# Patient Record
Sex: FEMALE | URBAN - METROPOLITAN AREA
[De-identification: names, ages, dates, MRNs, and addresses within clinical notes are randomized per-mention and may not be internally consistent; named-entity substitution may affect disease eponyms.]

---

## 2021-02-13 DIAGNOSIS — Z23 ENCOUNTER FOR IMMUNIZATION: ICD-10-CM

## 2021-02-19 ENCOUNTER — IMMUNIZATIONS (OUTPATIENT)
Dept: FAMILY MEDICINE CLINIC | Facility: HOSPITAL | Age: 76
End: 2021-02-19

## 2021-02-19 DIAGNOSIS — Z23 ENCOUNTER FOR IMMUNIZATION: Primary | ICD-10-CM

## 2021-02-19 PROCEDURE — 91300 SARS-COV-2 / COVID-19 MRNA VACCINE (PFIZER-BIONTECH) 30 MCG: CPT

## 2021-02-19 PROCEDURE — 0001A SARS-COV-2 / COVID-19 MRNA VACCINE (PFIZER-BIONTECH) 30 MCG: CPT

## 2021-03-12 ENCOUNTER — IMMUNIZATIONS (OUTPATIENT)
Dept: FAMILY MEDICINE CLINIC | Facility: HOSPITAL | Age: 76
End: 2021-03-12

## 2021-03-12 DIAGNOSIS — Z23 ENCOUNTER FOR IMMUNIZATION: Primary | ICD-10-CM

## 2021-03-12 PROCEDURE — 91300 SARS-COV-2 / COVID-19 MRNA VACCINE (PFIZER-BIONTECH) 30 MCG: CPT

## 2021-03-12 PROCEDURE — 0002A SARS-COV-2 / COVID-19 MRNA VACCINE (PFIZER-BIONTECH) 30 MCG: CPT

## 2025-02-20 ENCOUNTER — OFFICE VISIT (OUTPATIENT)
Age: 80
End: 2025-02-20
Payer: MEDICARE

## 2025-02-20 VITALS
HEIGHT: 63 IN | TEMPERATURE: 97.6 F | WEIGHT: 156 LBS | SYSTOLIC BLOOD PRESSURE: 168 MMHG | BODY MASS INDEX: 27.64 KG/M2 | OXYGEN SATURATION: 95 % | RESPIRATION RATE: 18 BRPM | HEART RATE: 110 BPM | DIASTOLIC BLOOD PRESSURE: 92 MMHG

## 2025-02-20 DIAGNOSIS — R03.0 ELEVATED BP WITHOUT DIAGNOSIS OF HYPERTENSION: ICD-10-CM

## 2025-02-20 DIAGNOSIS — E78.2 MIXED HYPERLIPIDEMIA: ICD-10-CM

## 2025-02-20 DIAGNOSIS — B02.9 HERPES ZOSTER WITHOUT COMPLICATION: ICD-10-CM

## 2025-02-20 DIAGNOSIS — Z87.891 HISTORY OF NICOTINE DEPENDENCE: ICD-10-CM

## 2025-02-20 DIAGNOSIS — Z76.89 ESTABLISHING CARE WITH NEW DOCTOR, ENCOUNTER FOR: Primary | ICD-10-CM

## 2025-02-20 PROCEDURE — 99203 OFFICE O/P NEW LOW 30 MIN: CPT | Performed by: INTERNAL MEDICINE

## 2025-02-20 RX ORDER — METHYLPREDNISOLONE 4 MG/1
TABLET ORAL
COMMUNITY
Start: 2025-02-05

## 2025-02-20 RX ORDER — GABAPENTIN 100 MG/1
CAPSULE ORAL
Qty: 99 CAPSULE | Refills: 0 | Status: SHIPPED | OUTPATIENT
Start: 2025-02-20 | End: 2025-03-28

## 2025-02-20 RX ORDER — VALACYCLOVIR HYDROCHLORIDE 1 G/1
TABLET, FILM COATED ORAL
COMMUNITY
Start: 2025-01-23

## 2025-02-20 RX ORDER — HYDROXYZINE HYDROCHLORIDE 25 MG/1
TABLET, FILM COATED ORAL
COMMUNITY
Start: 2025-02-05

## 2025-02-20 NOTE — PATIENT INSTRUCTIONS
"Patient Education     Checking your blood pressure at home   The Basics   Written by the doctors and editors at [facility]   How is blood pressure measured? -- It is usually measured with a device that goes around the upper arm. This is called a \"blood pressure cuff.\" This is often done in a doctor's office. But some people also check their blood pressure themselves, at home or at work. Doctors call this \"self-measured blood pressure monitoring.\"  Blood pressure is explained with 2 numbers. For instance, your blood pressure might be \"140 over 90.\" The first (top) number is the pressure inside your arteries when your heart is odessa. The second (bottom) number is the pressure inside your arteries when your heart is relaxed. The table shows how doctors and nurses define high and normal blood pressure (table 1).  If your blood pressure gets too high, it puts you at risk for heart attack, stroke, and kidney disease. High blood pressure does not usually cause symptoms. But it can be serious.  What is a home blood pressure meter? -- A home blood pressure meter (or \"monitor\") is a device you can use to check your blood pressure yourself. It has a cuff that goes around your upper arm (figure 1). Some devices have a cuff that goes around your wrist instead. But doctors aren't sure if these work as well. The meter also has a small screen, or dial, that shows your blood pressure numbers.  There are also special meters you can wear for a day or 2. These are different because they automatically check your blood pressure throughout the day and night, even while you are sleeping. If your doctor thinks that you should use one of these devices, they will tell you how to wear it.  Why do I need to check my blood pressure myself? -- If your doctor knows or suspects that you have high blood pressure, they might want you to check it at home. There are a few reasons for this. Your doctor might want to look at:   Whether your blood " pressure measures the same at home as it did in the doctor's office   How well your blood pressure medicines are working   Changes in your blood pressure, for example, if it goes up and down  People who check their own blood pressure usually do better at keeping it low.  How do I choose a home blood pressure meter? -- When choosing a home blood pressure meter, you will probably want to think about:   Cost - Some devices cost more than others. You should also check to see if your insurance will help pay for your device.   Size - It's important to make sure that the cuff fits your arm comfortably. Your doctor or nurse can help you with this.   How easy it is to use - Make sure that you understand how to use the device. You also need to be able to read the numbers on the screen.  You do not need a prescription to buy a home blood pressure meter. You can buy them at most pharmacies or online. Your doctor or nurse can help you choose the right device for you. They should also check your device about once every year to make sure that it is working correctly.  How do I check my blood pressure at home? -- Once you have a home blood pressure meter, your doctor or nurse should check it to make sure that it fits you and works correctly.  When it's time to check your blood pressure:   Go to the bathroom and empty your bladder first. Having a full bladder can temporarily increase your blood pressure, making the results inaccurate.   Sit in a chair with your feet flat on the ground.   Try to breathe normally and stay calm.   Attach the cuff to your arm. Place the cuff directly on your skin, not over your clothing. The cuff should be tight enough to not slip down, but not uncomfortably tight.   Sit and relax for about 3 to 5 minutes with the cuff on.   Follow the directions that came with your device to start measuring your blood pressure. This might involve squeezing the bulb at the end of the tube to inflate the cuff (fill it  with air). With some monitors, you press a button to inflate the cuff. When the cuff fills with air, it feels like someone is squeezing your arm, but it should not hurt. Then, you will slowly deflate the cuff (let the air out of it), or it will deflate by itself. The screen or dial will show your blood pressure numbers.   Stay seated and relax for 1 minute, then measure your blood pressure again.  How often should I check my blood pressure? -- It depends. Different people need to follow different schedules. Your doctor or nurse will tell you how often to check your blood pressure, and when. Some people need to check their blood pressure twice a day, in the morning and evening.  Your doctor or nurse will probably tell you to keep track of your blood pressure for at least a few days (table 2). Then, they will look at the numbers. This is because it's normal for your blood pressure to change a bit from day to day. For example, the numbers might change depending on whether you recently had caffeine, just exercised, or feel stressed. Checking your blood pressure over several days, or longer, gives your doctor or nurse a better idea of what is average for you.  How should I keep track of my blood pressure? -- Some blood pressure meters will record your numbers for you, or send them to your computer or smartphone. If yours does not do this, you need to write them down. Your doctor or nurse can help you figure out the best way to keep track of the numbers.  What if my blood pressure is high? -- Your doctor or nurse will tell you what to do if your blood pressure is high when you check it at home. If you get a number that is higher than normal, measure it again to see if it is still high. If it is very high (above a certain number, which your doctor or nurse will tell you to watch out for), call your doctor right away.  If your blood pressure is only a little high, your doctor or nurse might tell you to keep checking it for  "a few more days or weeks, and then call if it does not go back down. Then, they can help you decide what to do next.  All topics are updated as new evidence becomes available and our peer review process is complete.  This topic retrieved from [employer] on: Mar 29, 2024.  Topic 986597 Version 11.0  Release: 32.2.4 - C32.87  © 2024 UpToDate, Inc. and/or its affiliates. All rights reserved.  table 1: Definition of normal and high blood pressure  Level  Top number  Bottom number    High 130 or above 80 or above   Elevated 120 to 129 79 or below   Normal 119 or below 79 or below   These definitions are from the American College of Cardiology/American Heart Association. Other expert groups might use slightly different definitions.  \"Elevated blood pressure\" is a term doctor or nurses use as a warning. It means you do not yet have high blood pressure, but your blood pressure is not as low as it should be for good health.  Graphic 59589 Version 6.0  figure 1: Using a home blood pressure meter     This is an example of a person using a home blood pressure meter. Blood pressure is explained with 2 numbers. For instance, your blood pressure might be \"140 over 90.\" The \"systolic\" (first) number is the pressure inside your arteries when your heart is odessa. The \"diastolic\" (second) number is the pressure inside your arteries when your heart is relaxed. Most home blood pressure monitors also show your pulse. Your pulse is the number of times your heart beats in 1 minute.  Graphic 213443 Version 2.0  table 2: 7-day diary for checking blood pressure at home  Day 1  Day 2  Day 3  Day 4  Day 5  Day 6  Day 7    Morning  1st read Morning  1st read Morning  1st read Morning  1st read Morning  1st read Morning  1st read Morning  1st read   Systolic: __________ Systolic: __________ Systolic: __________ Systolic: __________ Systolic: __________ Systolic: __________ Systolic: __________   Diastolic: __________ Diastolic: __________ " Diastolic: __________ Diastolic: __________ Diastolic: __________ Diastolic: __________ Diastolic: __________   Pulse: __________ Pulse: __________ Pulse: __________ Pulse: __________ Pulse: __________ Pulse: __________ Pulse: __________   Morning  2nd read Morning  2nd read Morning  2nd read Morning  2nd read Morning  2nd read Morning  2nd read Morning  2nd read   Systolic: __________ Systolic: __________ Systolic: __________ Systolic: __________ Systolic: __________ Systolic: __________ Systolic: __________   Diastolic: __________ Diastolic: __________ Diastolic: __________ Diastolic: __________ Diastolic: __________ Diastolic: __________ Diastolic: __________   Pulse: __________ Pulse: __________ Pulse: __________ Pulse: __________ Pulse: __________ Pulse: __________ Pulse: __________   Evening  1st read Evening  1st read Evening  1st read Evening  1st read Evening  1st read Evening  1st read Evening  1st read   Systolic: __________ Systolic: __________ Systolic: __________ Systolic: __________ Systolic: __________ Systolic: __________ Systolic: __________   Diastolic: __________ Diastolic: __________ Diastolic: __________ Diastolic: __________ Diastolic: __________ Diastolic: __________ Diastolic: __________   Pulse: __________ Pulse: __________ Pulse: __________ Pulse: __________ Pulse: __________ Pulse: __________ Pulse: __________   Evening  2nd read Evening  2nd read Evening  2nd read Evening  2nd read Evening  2nd read Evening  2nd read Evening  2nd read   Systolic: __________ Systolic: __________ Systolic: __________ Systolic: __________ Systolic: __________ Systolic: __________ Systolic: __________   Diastolic: __________ Diastolic: __________ Diastolic: __________ Diastolic: __________ Diastolic: __________ Diastolic: __________ Diastolic: __________   Pulse: __________ Pulse: __________ Pulse: __________ Pulse: __________ Pulse: __________ Pulse: __________ Pulse: __________   Notes    Notes    Notes     "Notes    Notes    Notes    Notes      ____________________ ____________________ ____________________ ____________________ ____________________ ____________________ ____________________   ____________________ ____________________ ____________________ ____________________ ____________________ ____________________ ____________________   ____________________ ____________________ ____________________ ____________________ ____________________ ____________________ ____________________   Patient name: ______________________________     Patient ID: ________________________________    Primary care provider: _______________________    Average BP: _______________________________    You can use this table to keep track of your blood pressure (BP) and pulse.  When looking at your home meter, you will probably see at least 3 numbers:  Your \"systolic\" blood pressure: This is the top number of a BP measurement. For example, if your BP is \"140 over 90,\" 140 is the systolic.  Your \"diastolic\" blood pressure: This is the bottom number of a BP measurement. If your BP is \"140 over 90,\" 90 is the diastolic.  Your pulse: This is the number of times your heart beats in 1 minute.  BP: blood pressure.  Graphic 488641 Version 2.0  Consumer Information Use and Disclaimer   Disclaimer: This generalized information is a limited summary of diagnosis, treatment, and/or medication information. It is not meant to be comprehensive and should be used as a tool to help the user understand and/or assess potential diagnostic and treatment options. It does NOT include all information about conditions, treatments, medications, side effects, or risks that may apply to a specific patient. It is not intended to be medical advice or a substitute for the medical advice, diagnosis, or treatment of a health care provider based on the health care provider's examination and assessment of a patient's specific and unique circumstances. Patients must speak with a " "health care provider for complete information about their health, medical questions, and treatment options, including any risks or benefits regarding use of medications. This information does not endorse any treatments or medications as safe, effective, or approved for treating a specific patient. Up[employer], Inc[employer] and its affiliates disclaim any warranty or liability relating to this information or the use thereof.The use of this information is governed by the Terms of Use, available at https://www.woltersColdWattuwer.com/en/know/clinical-effectiveness-terms. 2024© UpToDate, Inc. and its affiliates and/or licensors. All rights reserved.  Copyright   © 2024 [employer]Date, Inc. and/or its affiliates. All rights reserved.  Patient Education     DASH diet   The Basics   Written by the doctors and editors at [employer]   What is the DASH diet? -- DASH stands for \"dietary approaches to stop hypertension.\" It is an eating plan that can help lower blood pressure. It can also help prevent high blood pressure, which doctors call \"hypertension.\" You don't need special foods or recipes to follow the DASH diet. It is more about eating certain types of foods in certain amounts.  The DASH diet has lots of fruits and vegetables, whole grains, lean meats, healthy fats, and low-fat or fat-free dairy products (figure 1). It is low in saturated fats, trans fats, cholesterol, added sugars, and sodium (salt).  The standard DASH diet limits sodium to no more than 2300 mg a day. Your doctor or nurse can talk to you about what your specific goals should be.  Why do I need the DASH diet? -- The DASH diet can help you:   Lower your blood pressure and cholesterol   Lower your risk for cancer, heart disease, heart attack, and stroke. It might also lower your risk for heart failure, kidney stones, and diabetes.   Lose weight or keep a healthy weight  What can I eat and drink on the DASH diet? -- Below are some guidelines and examples for your daily and weekly " "nutrition goals. These are based on a 2000-calorie-per-day eating plan.  Daily goals:   Grains - Try to eat 6 to 8 servings of whole-grain, high-fiber foods each day. Examples of a serving include 1 slice of bread, 1 ounce (30 grams) of dry cereal, or 1/2 cup (120 grams) of cooked cereal, pasta, or brown rice.   Fruits - Try to eat 4 to 5 servings of fruit each day. Examples of a serving include 1 medium fruit or 1/2 cup (75 grams) of fresh, frozen, or canned fruit. Try to eat different kinds and colors. Frozen or canned fruit should not have added sugar. Look for frozen or canned fruits with 100 percent fruit juice or water.   Vegetables - Try to eat 4 to 5 servings of vegetables each day. Examples of a serving include 1 cup (40 grams) of leafy greens or 1/2 cup (75 grams) of fresh or cooked vegetables. Try to pick many kinds and colors. If you buy canned vegetables, look for \"low sodium\" or \"salt free.\" Buy plain, frozen vegetables to avoid added fat and sodium.   Dairy - Try to eat 2 to 3 servings of fat-free or low-fat milk products each day. Examples of a serving include 1 cup (240 mL) of milk or yogurt or 1.5 ounces (45 grams) of cheese.   Lean meats, poultry, and seafood - Try to eat 6 or fewer servings of lean meat, poultry, and seafood each day. Examples of a serving include 1 egg or 1 ounce (30 grams) of cooked meat, poultry, or fish. Try to choose more low-fat or lean meats like chicken, fish, or turkey. Eat less red meat.   Fats and oils - Try to eat 2 to 3 servings of fats and oils each day. Examples of a serving include 1 teaspoon (5 mL) of soft margarine or vegetable oil, or 1 tablespoon (18 grams) of mayonnaise. Eat healthy fats like those found in fish, nuts, and avocados. Try using olive oil or vegetable oils such as canola oil. You can also try corn, safflower, sunflower, or soybean oils. Use low-sodium and low-fat salad dressing and mayonnaise.  Weekly goals:   Nuts, seeds, and legumes (dry beans " and peas) - Try to eat 4 to 5 servings each week. Examples of a serving include 1/3 cup (45 grams) of nuts, 2 tablespoons (50 grams) of nut butter or seeds, or 1/2 cup (75 grams) of cooked legumes. Try almonds and walnuts, sunflower seeds, peanut or other nut butters, soybeans, lentils, kidney beans, and split peas.   Sweets - Try to eat fewer than 5 servings each week. Examples of a serving include 1 tablespoon (14 grams) of sugar or jelly, or 1/2 cup (120 grams) of gelatin. Choose low-fat and trans fat-free desserts. These include fruit-flavored gelatin, sorbet, jellybeans, rabia crackers, animal crackers, low-fat fig bars, and madhav snaps. Eat fruit to satisfy the desire for sweets.  To add flavor, use pepper, herbs, spices, vinegar, or lemon or lime juices. Choose low-sodium or salt-free products whenever you can. This is especially important for foods like broths, soups, or soy sauce.  What foods and drinks should I avoid on the DASH diet?    Grains to avoid - Salted breads, rolls, crackers, quick breads, self-rising flours, biscuit mixes, regular breadcrumbs, instant hot cereals, commercially prepared rice, pasta, stuffing mixes.   Fruits and vegetables to avoid - Store-bought prepared potatoes and vegetable mixes, regular canned vegetables and juices, vegetables frozen with sauce, pickled vegetables, processed fruits with salt or sodium.   Dairy products to avoid - Whole milk, malted milk, chocolate milk, buttermilk, full-fat cheese, ice cream.   Meats to avoid - Smoked, cured, salted, or canned fish such as sardines or anchovies. High-fat cuts of meat like beef, lamb, pork, zambrano and sausage, and chicken with the skin on it.   Fats and oils to avoid - Eat fewer solid fats like butter, lard, and hard stick margarine. Eat less saturated fat, trans fat, and total fat.   Condiments and snacks to avoid - Salted and canned peas, beans, and olives. Salted snack foods, fried foods, soda, other sweetened drinks.    Sweets to avoid - High-fat baked goods such as muffins, donuts, pastries, and commercial baked goods. Candy bars.   Alcohol - If you choose to drink alcohol, limit the amount. Most doctors recommend limiting alcohol to no more than 1 drink a day (for females) or 2 drinks a day (for males).  What else do I need to know?    Get regular physical activity to make this diet help you even more. Even gentle forms of activity, like walking, are good for your health.   Try baking or broiling instead of frying foods.   Write down the foods that you eat. This will help you track what you have eaten each week.   When you go to the grocery store, have a list or a meal plan. Don't shop when you are hungry, since this might lead you to buy more unhealthy foods.   Read food labels with care (figure 2). They show you how much is in a serving. The amount is given as a percentage of the total amount that you need each day. Reading labels helps you make healthy food choices.  All topics are updated as new evidence becomes available and our peer review process is complete.  This topic retrieved from [email or web address] on: Feb 26, 2024.  Topic 186603 Version 1.0  Release: 32.2.4 - C32.56  © 2024 UpToDate, Inc. and/or its affiliates. All rights reserved.  figure 1: DASH diet     Graphic 390281 Version 1.0  figure 2: Food label     Graphic 673853 Version 1.0  Consumer Information Use and Disclaimer   Disclaimer: This generalized information is a limited summary of diagnosis, treatment, and/or medication information. It is not meant to be comprehensive and should be used as a tool to help the user understand and/or assess potential diagnostic and treatment options. It does NOT include all information about conditions, treatments, medications, side effects, or risks that may apply to a specific patient. It is not intended to be medical advice or a substitute for the medical advice, diagnosis, or treatment of a health care provider based on the health  care provider's examination and assessment of a patient's specific and unique circumstances. Patients must speak with a health care provider for complete information about their health, medical questions, and treatment options, including any risks or benefits regarding use of medications. This information does not endorse any treatments or medications as safe, effective, or approved for treating a specific patient. Up[employer], Inc[employer] and its affiliates disclaim any warranty or liability relating to this information or the use thereof.The use of this information is governed by the Terms of Use, available at https://www.wolRivalryuwer.com/en/know/clinical-effectiveness-terms. 2024© UpToDate, Inc. and its affiliates and/or licensors. All rights reserved.  Copyright   © 2024 Up[employer]Date, Inc. and/or its affiliates. All rights reserved.

## 2025-02-20 NOTE — PROGRESS NOTES
Name: Donna Schoenek      : 1945      MRN: [id number]  Encounter Provider: Eric Szymanski MD  Encounter Date: 2025   Encounter department: [facility] PRIMARY CARE  :  Assessment & Plan  Establishing care with new doctor, encounter for    Orders:    CBC and differential; Future    Comprehensive metabolic panel; Future    gabapentin (Neurontin) 100 mg capsule; Take 1 capsule (100 mg total) by mouth daily at bedtime for 3 days, THEN 1 capsule (100 mg total) 2 (two) times a day for 3 days, THEN 1 capsule (100 mg total) 3 (three) times a day.    Mixed hyperlipidemia    Orders:    CBC and differential; Future    Comprehensive metabolic panel; Future    Lipid panel; Future    gabapentin (Neurontin) 100 mg capsule; Take 1 capsule (100 mg total) by mouth daily at bedtime for 3 days, THEN 1 capsule (100 mg total) 2 (two) times a day for 3 days, THEN 1 capsule (100 mg total) 3 (three) times a day.    Herpes zoster without complication  Patient had herpes zoster rash on the left side of the face close to the years, end of January treated with a course of Valtrex and antibiotics, rash is now improved.  Reports that sensitivity and tingling pain in that area since then, somewhat improving with ice packs.  Will start gabapentin 100 mg at bedtime and uptitrate as tolerated.  Discussed supportive management, follow-up in 1 month.  Patient cautioned for drowsiness.  Encourage hydration,  Orders:    CBC and differential; Future    Comprehensive metabolic panel; Future    gabapentin (Neurontin) 100 mg capsule; Take 1 capsule (100 mg total) by mouth daily at bedtime for 3 days, THEN 1 capsule (100 mg total) 2 (two) times a day for 3 days, THEN 1 capsule (100 mg total) 3 (three) times a day.    History of nicotine dependence  Quit in .  Encourage to remain smoke-free.  Orders:    CBC and differential; Future    Comprehensive metabolic panel; Future    gabapentin (Neurontin) 100 mg capsule; Take 1  capsule (100 mg total) by mouth daily at bedtime for 3 days, THEN 1 capsule (100 mg total) 2 (two) times a day for 3 days, THEN 1 capsule (100 mg total) 3 (three) times a day.    Elevated BP without diagnosis of hypertension  Patient reports she has a whitecoat hypertension.  Recommend to follow-up with her home BP logs in 4 weeks, encouraged DASH diet, complete blood work, and to follow-up in 4 weeks.  repeat BP was 168/70  Orders:    Albumin / creatinine urine ratio; Future    Urinalysis with microscopic; Future           History of Present Illness     Patient comes to establish care.  She has not access to medical care in the last several years.  She was seen recently at urgent care for left-sided herpes zoster treated appropriately with Valtrex and steroid.  Patient continues to have tingling and sharp shooting pain with sensitivity in the area of the rash.  Denies other subjective complaints.  HPI  Review of Systems   Constitutional:  Negative for appetite change, chills, diaphoresis, fatigue, fever and unexpected weight change.   Respiratory:  Negative for apnea, cough, choking, chest tightness, shortness of breath, wheezing and stridor.    Cardiovascular:  Negative for chest pain, palpitations and leg swelling.   Gastrointestinal:  Negative for abdominal distention, abdominal pain, anal bleeding, blood in stool, constipation, diarrhea, nausea and vomiting.   Genitourinary:  Negative for decreased urine volume, difficulty urinating, frequency and urgency.   Musculoskeletal:  Negative for arthralgias, back pain and myalgias.   Skin:  Positive for rash.   Neurological:  Negative for dizziness, light-headedness, numbness and headaches.     \  Past Medical History   Past Medical History:   Diagnosis Date    Shingles      History reviewed. No pertinent surgical history.  Family History   Problem Relation Age of Onset    Hearing loss Mother     COPD Mother     Heart disease Mother     Coronary artery disease Mother   "   Arthritis Mother     Vision loss Mother         detached retna    Heart disease Father       reports that she quit smoking about 25 years ago. Her smoking use included cigarettes. She started smoking about 60 years ago. She has a 35 pack-year smoking history. She has never used smokeless tobacco. She reports current alcohol use. She reports that she does not use drugs.  Current Outpatient Medications   Medication Instructions    gabapentin (Neurontin) 100 mg capsule Take 1 capsule (100 mg total) by mouth daily at bedtime for 3 days, THEN 1 capsule (100 mg total) 2 (two) times a day for 3 days, THEN 1 capsule (100 mg total) 3 (three) times a day.    hydrOXYzine HCL (ATARAX) 25 mg tablet TAKE 1 TABLET BY MOUTH FOUR TIMES DAILY AS NEEDED FOR ITCHING    methylPREDNISolone 4 MG tablet therapy pack take as directed per package instructions    valACYclovir (VALTREX) 1,000 mg tablet take 1 tablet by mouth three times daily for 7 days     Allergies   Allergen Reactions    Pollen Extract Sneezing      Objective   /92 (BP Location: Left arm, Patient Position: Sitting, Cuff Size: Standard)   Pulse (!) 110   Temp 97.6 °F (36.4 °C) (Temporal)   Resp 18   Ht 5' 2.75\" (1.594 m)   Wt 70.8 kg (156 lb)   SpO2 95%   BMI 27.85 kg/m²      Physical Exam  Constitutional:       General: She is not in acute distress.     Appearance: Normal appearance. She is normal weight. She is not ill-appearing, toxic-appearing or diaphoretic.   HENT:      Right Ear: Tympanic membrane normal.      Left Ear: Tympanic membrane normal.      Mouth/Throat:      Pharynx: No oropharyngeal exudate or posterior oropharyngeal erythema.   Cardiovascular:      Rate and Rhythm: Normal rate and regular rhythm.      Pulses: Normal pulses.      Heart sounds: Normal heart sounds. No murmur heard.     No gallop.   Pulmonary:      Effort: Pulmonary effort is normal. No respiratory distress.      Breath sounds: Normal breath sounds. No stridor. No wheezing, " rhonchi or rales.   Chest:      Chest wall: No tenderness.   Musculoskeletal:      Right lower leg: No edema.      Left lower leg: No edema.   Skin:     Comments: Small area of healing rash is seen just behind the left ear.   Neurological:      Mental Status: She is alert and oriented to person, place, and time.

## 2025-03-12 ENCOUNTER — APPOINTMENT (OUTPATIENT)
Dept: LAB | Facility: HOSPITAL | Age: 80
End: 2025-03-12
Payer: MEDICARE

## 2025-03-12 DIAGNOSIS — Z76.89 ESTABLISHING CARE WITH NEW DOCTOR, ENCOUNTER FOR: ICD-10-CM

## 2025-03-12 DIAGNOSIS — Z87.891 HISTORY OF NICOTINE DEPENDENCE: ICD-10-CM

## 2025-03-12 DIAGNOSIS — E78.2 MIXED HYPERLIPIDEMIA: ICD-10-CM

## 2025-03-12 DIAGNOSIS — R03.0 ELEVATED BP WITHOUT DIAGNOSIS OF HYPERTENSION: ICD-10-CM

## 2025-03-12 DIAGNOSIS — B02.9 HERPES ZOSTER WITHOUT COMPLICATION: ICD-10-CM

## 2025-03-12 LAB
ALBUMIN SERPL BCG-MCNC: 4.3 G/DL (ref 3.5–5)
ALP SERPL-CCNC: 81 U/L (ref 34–104)
ALT SERPL W P-5'-P-CCNC: 17 U/L (ref 7–52)
ANION GAP SERPL CALCULATED.3IONS-SCNC: 10 MMOL/L (ref 4–13)
AST SERPL W P-5'-P-CCNC: 20 U/L (ref 13–39)
BACTERIA UR QL AUTO: ABNORMAL /HPF
BASOPHILS # BLD AUTO: 0.05 THOUSANDS/ÂΜL (ref 0–0.1)
BASOPHILS NFR BLD AUTO: 1 % (ref 0–1)
BILIRUB SERPL-MCNC: 0.49 MG/DL (ref 0.2–1)
BILIRUB UR QL STRIP: NEGATIVE
BUN SERPL-MCNC: 15 MG/DL (ref 5–25)
CALCIUM SERPL-MCNC: 9.6 MG/DL (ref 8.4–10.2)
CHLORIDE SERPL-SCNC: 104 MMOL/L (ref 96–108)
CHOLEST SERPL-MCNC: 211 MG/DL (ref ?–200)
CLARITY UR: ABNORMAL
CO2 SERPL-SCNC: 27 MMOL/L (ref 21–32)
COLOR UR: YELLOW
CREAT SERPL-MCNC: 0.6 MG/DL (ref 0.6–1.3)
CREAT UR-MCNC: 204.3 MG/DL
EOSINOPHIL # BLD AUTO: 0.08 THOUSAND/ÂΜL (ref 0–0.61)
EOSINOPHIL NFR BLD AUTO: 1 % (ref 0–6)
ERYTHROCYTE [DISTWIDTH] IN BLOOD BY AUTOMATED COUNT: 13.2 % (ref 11.6–15.1)
GFR SERPL CREATININE-BSD FRML MDRD: 87 ML/MIN/1.73SQ M
GLUCOSE P FAST SERPL-MCNC: 99 MG/DL (ref 65–99)
GLUCOSE UR STRIP-MCNC: NEGATIVE MG/DL
HCT VFR BLD AUTO: 45.2 % (ref 34.8–46.1)
HDLC SERPL-MCNC: 60 MG/DL
HGB BLD-MCNC: 14.6 G/DL (ref 11.5–15.4)
HGB UR QL STRIP.AUTO: NEGATIVE
IMM GRANULOCYTES # BLD AUTO: 0.02 THOUSAND/UL (ref 0–0.2)
IMM GRANULOCYTES NFR BLD AUTO: 0 % (ref 0–2)
KETONES UR STRIP-MCNC: NEGATIVE MG/DL
LDLC SERPL CALC-MCNC: 121 MG/DL (ref 0–100)
LEUKOCYTE ESTERASE UR QL STRIP: ABNORMAL
LYMPHOCYTES # BLD AUTO: 2.3 THOUSANDS/ÂΜL (ref 0.6–4.47)
LYMPHOCYTES NFR BLD AUTO: 27 % (ref 14–44)
MCH RBC QN AUTO: 32.2 PG (ref 26.8–34.3)
MCHC RBC AUTO-ENTMCNC: 32.3 G/DL (ref 31.4–37.4)
MCV RBC AUTO: 100 FL (ref 82–98)
MICROALBUMIN UR-MCNC: 24.4 MG/L
MICROALBUMIN/CREAT 24H UR: 12 MG/G CREATININE (ref 0–30)
MONOCYTES # BLD AUTO: 0.74 THOUSAND/ÂΜL (ref 0.17–1.22)
MONOCYTES NFR BLD AUTO: 9 % (ref 4–12)
MUCOUS THREADS UR QL AUTO: ABNORMAL
NEUTROPHILS # BLD AUTO: 5.2 THOUSANDS/ÂΜL (ref 1.85–7.62)
NEUTS SEG NFR BLD AUTO: 62 % (ref 43–75)
NITRITE UR QL STRIP: NEGATIVE
NON-SQ EPI CELLS URNS QL MICRO: ABNORMAL /HPF
NONHDLC SERPL-MCNC: 151 MG/DL
NRBC BLD AUTO-RTO: 0 /100 WBCS
PH UR STRIP.AUTO: 6 [PH]
PLATELET # BLD AUTO: 204 THOUSANDS/UL (ref 149–390)
PMV BLD AUTO: 9.3 FL (ref 8.9–12.7)
POTASSIUM SERPL-SCNC: 3.8 MMOL/L (ref 3.5–5.3)
PROT SERPL-MCNC: 7.6 G/DL (ref 6.4–8.4)
PROT UR STRIP-MCNC: NEGATIVE MG/DL
RBC # BLD AUTO: 4.53 MILLION/UL (ref 3.81–5.12)
RBC #/AREA URNS AUTO: ABNORMAL /HPF
SODIUM SERPL-SCNC: 141 MMOL/L (ref 135–147)
SP GR UR STRIP.AUTO: 1.02 (ref 1–1.03)
TRIGL SERPL-MCNC: 152 MG/DL (ref ?–150)
UROBILINOGEN UR QL STRIP.AUTO: 0.2 E.U./DL
WBC # BLD AUTO: 8.39 THOUSAND/UL (ref 4.31–10.16)
WBC #/AREA URNS AUTO: ABNORMAL /HPF

## 2025-03-12 PROCEDURE — 81001 URINALYSIS AUTO W/SCOPE: CPT

## 2025-03-12 PROCEDURE — 82570 ASSAY OF URINE CREATININE: CPT

## 2025-03-12 PROCEDURE — 80061 LIPID PANEL: CPT

## 2025-03-12 PROCEDURE — 82043 UR ALBUMIN QUANTITATIVE: CPT

## 2025-03-12 PROCEDURE — 36415 COLL VENOUS BLD VENIPUNCTURE: CPT

## 2025-03-12 PROCEDURE — 85025 COMPLETE CBC W/AUTO DIFF WBC: CPT

## 2025-03-12 PROCEDURE — 80053 COMPREHEN METABOLIC PANEL: CPT

## 2025-03-18 ENCOUNTER — RA CDI HCC (OUTPATIENT)
Dept: OTHER | Facility: HOSPITAL | Age: 80
End: 2025-03-18

## 2025-03-18 NOTE — PROGRESS NOTES
HCC coding opportunities       Chart reviewed, no opportunity found: CHART REVIEWED, NO OPPORTUNITY FOUND        Patients Insurance     Medicare Insurance: Medicare           This was a shared visit with the NICOL. I reviewed and verified the documentation.

## 2025-03-20 ENCOUNTER — OFFICE VISIT (OUTPATIENT)
Age: 80
End: 2025-03-20
Payer: MEDICARE

## 2025-03-20 VITALS
SYSTOLIC BLOOD PRESSURE: 161 MMHG | RESPIRATION RATE: 16 BRPM | WEIGHT: 157.8 LBS | OXYGEN SATURATION: 97 % | BODY MASS INDEX: 29.04 KG/M2 | HEIGHT: 62 IN | HEART RATE: 108 BPM | DIASTOLIC BLOOD PRESSURE: 88 MMHG | TEMPERATURE: 97.6 F

## 2025-03-20 DIAGNOSIS — Z87.891 HISTORY OF NICOTINE DEPENDENCE: ICD-10-CM

## 2025-03-20 DIAGNOSIS — Z11.59 NEED FOR HEPATITIS C SCREENING TEST: ICD-10-CM

## 2025-03-20 DIAGNOSIS — G47.00 INSOMNIA, UNSPECIFIED TYPE: ICD-10-CM

## 2025-03-20 DIAGNOSIS — I10 PRIMARY HYPERTENSION: ICD-10-CM

## 2025-03-20 DIAGNOSIS — E78.2 MIXED HYPERLIPIDEMIA: Primary | ICD-10-CM

## 2025-03-20 DIAGNOSIS — B02.9 HERPES ZOSTER WITHOUT COMPLICATION: ICD-10-CM

## 2025-03-20 DIAGNOSIS — Z23 ENCOUNTER FOR IMMUNIZATION: ICD-10-CM

## 2025-03-20 DIAGNOSIS — Z78.0 ENCOUNTER FOR OSTEOPOROSIS SCREENING IN ASYMPTOMATIC POSTMENOPAUSAL PATIENT: ICD-10-CM

## 2025-03-20 DIAGNOSIS — Z00.00 MEDICARE ANNUAL WELLNESS VISIT, SUBSEQUENT: ICD-10-CM

## 2025-03-20 DIAGNOSIS — Z13.820 ENCOUNTER FOR OSTEOPOROSIS SCREENING IN ASYMPTOMATIC POSTMENOPAUSAL PATIENT: ICD-10-CM

## 2025-03-20 PROCEDURE — G0439 PPPS, SUBSEQ VISIT: HCPCS | Performed by: INTERNAL MEDICINE

## 2025-03-20 PROCEDURE — G0009 ADMIN PNEUMOCOCCAL VACCINE: HCPCS | Performed by: INTERNAL MEDICINE

## 2025-03-20 PROCEDURE — 99213 OFFICE O/P EST LOW 20 MIN: CPT | Performed by: INTERNAL MEDICINE

## 2025-03-20 PROCEDURE — G2211 COMPLEX E/M VISIT ADD ON: HCPCS | Performed by: INTERNAL MEDICINE

## 2025-03-20 PROCEDURE — 90677 PCV20 VACCINE IM: CPT | Performed by: INTERNAL MEDICINE

## 2025-03-20 RX ORDER — GABAPENTIN 100 MG/1
CAPSULE ORAL
Qty: 90 CAPSULE | Refills: 0 | Status: SHIPPED | OUTPATIENT
Start: 2025-03-20

## 2025-03-20 RX ORDER — HYDROXYZINE HYDROCHLORIDE 25 MG/1
25 TABLET, FILM COATED ORAL
Qty: 30 TABLET | Refills: 0 | Status: SHIPPED | OUTPATIENT
Start: 2025-03-20

## 2025-03-20 NOTE — PATIENT INSTRUCTIONS
Medicare Preventive Visit Patient Instructions  Thank you for completing your Welcome to Medicare Visit or Medicare Annual Wellness Visit today. Your next wellness visit will be due in one year (3/21/2026).  The screening/preventive services that you may require over the next 5-10 years are detailed below. Some tests may not apply to you based off risk factors and/or age. Screening tests ordered at today's visit but not completed yet may show as past due. Also, please note that scanned in results may not display below.  Preventive Screenings:  Service Recommendations Previous Testing/Comments   Colorectal Cancer Screening  * Colonoscopy    * Fecal Occult Blood Test (FOBT)/Fecal Immunochemical Test (FIT)  * Fecal DNA/Cologuard Test  * Flexible Sigmoidoscopy Age: 45-75 years old   Colonoscopy: every 10 years (may be performed more frequently if at higher risk)  OR  FOBT/FIT: every 1 year  OR  Cologuard: every 3 years  OR  Sigmoidoscopy: every 5 years  Screening may be recommended earlier than age 45 if at higher risk for colorectal cancer. Also, an individualized decision between you and your healthcare provider will decide whether screening between the ages of 76-85 would be appropriate. Colonoscopy: Not on file  FOBT/FIT: Not on file  Cologuard: Not on file  Sigmoidoscopy: Not on file    Screening Not Indicated     Breast Cancer Screening Age: 40+ years old  Frequency: every 1-2 years  Not required if history of left and right mastectomy Mammogram: Not on file    Screening Not Indicated   Cervical Cancer Screening Between the ages of 21-29, pap smear recommended once every 3 years.   Between the ages of 30-65, can perform pap smear with HPV co-testing every 5 years.   Recommendations may differ for women with a history of total hysterectomy, cervical cancer, or abnormal pap smears in past. Pap Smear: Not on file    Screening Not Indicated   Hepatitis C Screening Once for adults born between 1945 and 1965  More  frequently in patients at high risk for Hepatitis C Hep C Antibody: Not on file    Patient Declines   Diabetes Screening 1-2 times per year if you're at risk for diabetes or have pre-diabetes Fasting glucose: 99 mg/dL (3/12/2025)  A1C: No results in last 5 years (No results in last 5 years)  Screening Current   Cholesterol Screening Once every 5 years if you don't have a lipid disorder. May order more often based on risk factors. Lipid panel: 03/12/2025    Screening Not Indicated  History Lipid Disorder     Other Preventive Screenings Covered by Medicare:  Abdominal Aortic Aneurysm (AAA) Screening: covered once if your at risk. You're considered to be at risk if you have a family history of AAA.  Lung Cancer Screening: covers low dose CT scan once per year if you meet all of the following conditions: (1) Age 55-77; (2) No signs or symptoms of lung cancer; (3) Current smoker or have quit smoking within the last 15 years; (4) You have a tobacco smoking history of at least 20 pack years (packs per day multiplied by number of years you smoked); (5) You get a written order from a healthcare provider.  Glaucoma Screening: covered annually if you're considered high risk: (1) You have diabetes OR (2) Family history of glaucoma OR (3)  aged 50 and older OR (4)  American aged 65 and older  Osteoporosis Screening: covered every 2 years if you meet one of the following conditions: (1) You're estrogen deficient and at risk for osteoporosis based off medical history and other findings; (2) Have a vertebral abnormality; (3) On glucocorticoid therapy for more than 3 months; (4) Have primary hyperparathyroidism; (5) On osteoporosis medications and need to assess response to drug therapy.   Last bone density test (DXA Scan): Not on file.  HIV Screening: covered annually if you're between the age of 15-65. Also covered annually if you are younger than 15 and older than 65 with risk factors for HIV infection.  For pregnant patients, it is covered up to 3 times per pregnancy.    Immunizations:  Immunization Recommendations   Influenza Vaccine Annual influenza vaccination during flu season is recommended for all persons aged >= 6 months who do not have contraindications   Pneumococcal Vaccine   * Pneumococcal conjugate vaccine = PCV13 (Prevnar 13), PCV15 (Vaxneuvance), PCV20 (Prevnar 20)  * Pneumococcal polysaccharide vaccine = PPSV23 (Pneumovax) Adults 19-63 yo with certain risk factors or if 65+ yo  If never received any pneumonia vaccine: recommend Prevnar 20 (PCV20)  Give PCV20 if previously received 1 dose of PCV13 or PPSV23   Hepatitis B Vaccine 3 dose series if at intermediate or high risk (ex: diabetes, end stage renal disease, liver disease)   Respiratory syncytial virus (RSV) Vaccine - COVERED BY MEDICARE PART D  * RSVPreF3 (Arexvy) CDC recommends that adults 60 years of age and older may receive a single dose of RSV vaccine using shared clinical decision-making (SCDM)   Tetanus (Td) Vaccine - COST NOT COVERED BY MEDICARE PART B Following completion of primary series, a booster dose should be given every 10 years to maintain immunity against tetanus. Td may also be given as tetanus wound prophylaxis.   Tdap Vaccine - COST NOT COVERED BY MEDICARE PART B Recommended at least once for all adults. For pregnant patients, recommended with each pregnancy.   Shingles Vaccine (Shingrix) - COST NOT COVERED BY MEDICARE PART B  2 shot series recommended in those 19 years and older who have or will have weakened immune systems or those 50 years and older     Health Maintenance Due:      Topic Date Due   • Hepatitis C Screening  Never done     Immunizations Due:      Topic Date Due   • Pneumococcal Vaccine: 65+ Years (1 of 1 - PCV) Never done   • Influenza Vaccine (1) Never done   • COVID-19 Vaccine (3 - 2024-25 season) 09/01/2024     Advance Directives   What are advance directives?  Advance directives are legal documents that  state your wishes and plans for medical care. These plans are made ahead of time in case you lose your ability to make decisions for yourself. Advance directives can apply to any medical decision, such as the treatments you want, and if you want to donate organs.   What are the types of advance directives?  There are many types of advance directives, and each state has rules about how to use them. You may choose a combination of any of the following:  Living will:  This is a written record of the treatment you want. You can also choose which treatments you do not want, which to limit, and which to stop at a certain time. This includes surgery, medicine, IV fluid, and tube feedings.   Durable power of  for healthcare (DPAHC):  This is a written record that states who you want to make healthcare choices for you when you are unable to make them for yourself. This person, called a proxy, is usually a family member or a friend. You may choose more than 1 proxy.  Do not resuscitate (DNR) order:  A DNR order is used in case your heart stops beating or you stop breathing. It is a request not to have certain forms of treatment, such as CPR. A DNR order may be included in other types of advance directives.  Medical directive:  This covers the care that you want if you are in a coma, near death, or unable to make decisions for yourself. You can list the treatments you want for each condition. Treatment may include pain medicine, surgery, blood transfusions, dialysis, IV or tube feedings, and a ventilator (breathing machine).  Values history:  This document has questions about your views, beliefs, and how you feel and think about life. This information can help others choose the care that you would choose.  Why are advance directives important?  An advance directive helps you control your care. Although spoken wishes may be used, it is better to have your wishes written down. Spoken wishes can be misunderstood, or not  followed. Treatments may be given even if you do not want them. An advance directive may make it easier for your family to make difficult choices about your care.   Weight Management   Why it is important to manage your weight:  Being overweight increases your risk of health conditions such as heart disease, high blood pressure, type 2 diabetes, and certain types of cancer. It can also increase your risk for osteoarthritis, sleep apnea, and other respiratory problems. Aim for a slow, steady weight loss. Even a small amount of weight loss can lower your risk of health problems.  How to lose weight safely:  A safe and healthy way to lose weight is to eat fewer calories and get regular exercise. You can lose up about 1 pound a week by decreasing the number of calories you eat by 500 calories each day.   Healthy meal plan for weight management:  A healthy meal plan includes a variety of foods, contains fewer calories, and helps you stay healthy. A healthy meal plan includes the following:  Eat whole-grain foods more often.  A healthy meal plan should contain fiber. Fiber is the part of grains, fruits, and vegetables that is not broken down by your body. Whole-grain foods are healthy and provide extra fiber in your diet. Some examples of whole-grain foods are whole-wheat breads and pastas, oatmeal, brown rice, and bulgur.  Eat a variety of vegetables every day.  Include dark, leafy greens such as spinach, kale, dorian greens, and mustard greens. Eat yellow and orange vegetables such as carrots, sweet potatoes, and winter squash.   Eat a variety of fruits every day.  Choose fresh or canned fruit (canned in its own juice or light syrup) instead of juice. Fruit juice has very little or no fiber.  Eat low-fat dairy foods.  Drink fat-free (skim) milk or 1% milk. Eat fat-free yogurt and low-fat cottage cheese. Try low-fat cheeses such as mozzarella and other reduced-fat cheeses.  Choose meat and other protein foods that are  low in fat.  Choose beans or other legumes such as split peas or lentils. Choose fish, skinless poultry (chicken or turkey), or lean cuts of red meat (beef or pork). Before you cook meat or poultry, cut off any visible fat.   Use less fat and oil.  Try baking foods instead of frying them. Add less fat, such as margarine, sour cream, regular salad dressing and mayonnaise to foods. Eat fewer high-fat foods. Some examples of high-fat foods include french fries, doughnuts, ice cream, and cakes.  Eat fewer sweets.  Limit foods and drinks that are high in sugar. This includes candy, cookies, regular soda, and sweetened drinks.  Exercise:  Exercise at least 30 minutes per day on most days of the week. Some examples of exercise include walking, biking, dancing, and swimming. You can also fit in more physical activity by taking the stairs instead of the elevator or parking farther away from stores. Ask your healthcare provider about the best exercise plan for you.      © Copyright [employer] 2018 Information is for End User's use only and may not be sold, redistributed or otherwise used for commercial purposes. All illustrations and images included in CareNotes® are the copyrighted property of [employer] or [employer]

## 2025-03-20 NOTE — PROGRESS NOTES
Name: Donna Schoenek      : 1945      MRN: [id number]  Encounter Provider: Eric Szmyanski MD  Encounter Date: 3/20/2025   Encounter department: [facility] PRIMARY CARE    Assessment & Plan  Need for hepatitis C screening test    Orders:    Hepatitis C Antibody; Future    Mixed hyperlipidemia  LDL and triglycerides elevated, ASCVD of 34.5.  Discussed pharmacotherapy, patient wants to defer, recheck lipid panel in 6 months, discussed dietary modifications  Orders:    gabapentin (Neurontin) 100 mg capsule; Take 2 tablets 3 times a day x 1 week, followed by 3 tablets 3 times a day    Primary hypertension  Home BP logs stable  Continue DASH diet and regular physical activity, will defer medications at this time       Medicare annual wellness visit, subsequent         Herpes zoster without complication  Patient had herpes zoster rash on the left side of the face close to the years, end of January treated with a course of Valtrex and antibiotics, rash is now improved.  Reports that sensitivity and tingling pain in that area since then, somewhat improving with ice packs.  Titrate gabapentin as below, will add Atarax at bedtime for better sleep  Orders:    gabapentin (Neurontin) 100 mg capsule; Take 2 tablets 3 times a day x 1 week, followed by 3 tablets 3 times a day    hydrOXYzine HCL (ATARAX) 25 mg tablet; Take 1 tablet (25 mg total) by mouth daily at bedtime    History of nicotine dependence  Patient has quit smoke  Orders:    gabapentin (Neurontin) 100 mg capsule; Take 2 tablets 3 times a day x 1 week, followed by 3 tablets 3 times a day    Encounter for osteoporosis screening in asymptomatic postmenopausal patient    Orders:    DXA bone density spine hip and pelvis; Future    Insomnia, unspecified type  Secondary to postherpetic neuralgia.  Will give trial of Atarax for better  Orders:    hydrOXYzine HCL (ATARAX) 25 mg tablet; Take 1 tablet (25 mg total) by mouth daily at  bedtime    Encounter for immunization    Orders:    Pneumococcal Conjugate Vaccine 20-valent (Pcv20)       Preventive health issues were discussed with patient, and age appropriate screening tests were ordered as noted in patient's After Visit Summary. Personalized health advice and appropriate referrals for health education or preventive services given if needed, as noted in patient's After Visit Summary.    History of Present Illness     HPI     Comes for follow-up of multiple chronic medical conditions as noted above, follow-up on labs, review of blood pressure logs, and Medicare annual wellness visit.  Patient reports pain from her recent shingles, somewhat improved with gabapentin but still bothering her.  Patient Care Team:  Eric Szymanski MD as PCP - General (Internal Medicine)    Review of Systems   Constitutional:  Negative for appetite change, chills, diaphoresis, fatigue, fever and unexpected weight change.   Respiratory:  Negative for apnea, cough, choking, chest tightness, shortness of breath, wheezing and stridor.    Cardiovascular:  Negative for chest pain, palpitations and leg swelling.   Gastrointestinal:  Negative for abdominal distention, abdominal pain, anal bleeding, blood in stool, constipation, diarrhea, nausea and vomiting.   Genitourinary:  Negative for decreased urine volume, difficulty urinating, frequency and urgency.   Musculoskeletal:  Negative for arthralgias, back pain and myalgias.   Neurological:  Negative for dizziness, light-headedness, numbness and headaches.     Medical History Reviewed by provider this encounter:  Tobacco  Allergies  Meds  Problems  Med Hx  Surg Hx  Fam Hx       Annual Wellness Visit Questionnaire   Ruby is here for her Subsequent Wellness visit. Last Medicare Wellness visit information reviewed, patient interviewed, no change since last AWV.     Health Risk Assessment:   Patient rates overall health as good. Patient feels that their physical health  rating is same. Patient is satisfied with their life. Eyesight was rated as same. Hearing was rated as same. Patient feels that their emotional and mental health rating is slightly worse. Patients states they are never, rarely angry. Patient states they are never, rarely unusually tired/fatigued. Pain experienced in the last 7 days has been some. Patient's pain rating has been 1/10. Patient states that she has experienced no weight loss or gain in last 6 months.     Fall Risk Screening:   In the past year, patient has experienced: no history of falling in past year      Urinary Incontinence Screening:   Patient has not leaked urine accidently in the last six months.     Home Safety:  Patient does not have trouble with stairs inside or outside of their home. Patient has working smoke alarms and has working carbon monoxide detector. Home safety hazards include: none.     Nutrition:   Current diet is Regular, Low Cholesterol, Low Saturated Fat and No Added Salt.     Medications:   Patient is not currently taking any over-the-counter supplements. Patient is able to manage medications.     Activities of Daily Living (ADLs)/Instrumental Activities of Daily Living (IADLs):   Walk and transfer into and out of bed and chair?: Yes  Dress and groom yourself?: Yes    Bathe or shower yourself?: Yes    Feed yourself? Yes  Do your laundry/housekeeping?: Yes  Manage your money, pay your bills and track your expenses?: Yes  Make your own meals?: Yes    Do your own shopping?: Yes    Previous Hospitalizations:   Any hospitalizations or ED visits within the last 12 months?: No      Advance Care Planning:   Living will: Yes    Durable POA for healthcare: Yes      Cognitive Screening:   Provider or family/friend/caregiver concerned regarding cognition?: No    PREVENTIVE SCREENINGS      Cardiovascular Screening:    General: Screening Not Indicated and History Lipid Disorder      Diabetes Screening:     General: Screening Current       Colorectal Cancer Screening:     General: Screening Not Indicated      Breast Cancer Screening:     General: Screening Not Indicated      Cervical Cancer Screening:    General: Screening Not Indicated      Osteoporosis Screening:    General: Risks and Benefits Discussed    Due for: DXA Axial and DXA Appendicular      Abdominal Aortic Aneurysm (AAA) Screening:        General: Patient Declines      Lung Cancer Screening:     General: Screening Not Indicated      Hepatitis C Screening:    General: Patient Declines    Screening, Brief Intervention, and Referral to Treatment (SBIRT)     Screening    Typical number of drinks in a week: 0    AUDIT-C Screenin) How often did you have a drink containing alcohol in the past year? monthly or less  2) How many drinks did you have on a typical day when you were drinking in the past year? 1 to 2  3) How often did you have 6 or more drinks on one occasion in the past year? never    AUDIT-C Score: 1  Interpretation: Score 0-2 (female): Negative screen for alcohol misuse    Single Item Drug Screening:  How often have you used an illegal drug (including marijuana) or a prescription medication for non-medical reasons in the past year? never    Single Item Drug Screen Score: 0  Interpretation: Negative screen for possible drug use disorder    Other Counseling Topics:   Car/seat belt/driving safety, skin self-exam, sunscreen and calcium and vitamin D intake and regular weightbearing exercise.     Social Drivers of Health     Financial Resource Strain: Low Risk  (3/20/2025)    Overall Financial Resource Strain (CARDIA)     Difficulty of Paying Living Expenses: Not hard at all   Food Insecurity: No Food Insecurity (3/20/2025)    Hunger Vital Sign     Worried About Running Out of Food in the Last Year: Never true     Ran Out of Food in the Last Year: Never true   Transportation Needs: No Transportation Needs (3/20/2025)    PRAPARE - Transportation     Lack of Transportation (Medical):  "No     Lack of Transportation (Non-Medical): No   Housing Stability: Unknown (3/20/2025)    Housing Stability Vital Sign     Unable to Pay for Housing in the Last Year: No     Homeless in the Last Year: No   Utilities: Not At Risk (3/20/2025)    [facility] Utilities     Threatened with loss of utilities: No     No results found.      Past Medical History   Past Medical History:   Diagnosis Date    Shingles      History reviewed. No pertinent surgical history.  Family History   Problem Relation Age of Onset    Hearing loss Mother     COPD Mother     Heart disease Mother     Coronary artery disease Mother     Arthritis Mother     Vision loss Mother         detached retna    Heart disease Father       reports that she quit smoking about 25 years ago. Her smoking use included cigarettes. She started smoking about 60 years ago. She has a 35 pack-year smoking history. She has never used smokeless tobacco. She reports current alcohol use. She reports that she does not use drugs.  Current Outpatient Medications   Medication Instructions    gabapentin (Neurontin) 100 mg capsule Take 2 tablets 3 times a day x 1 week, followed by 3 tablets 3 times a day    hydrOXYzine HCL (ATARAX) 25 mg, Oral, Daily at bedtime    methylPREDNISolone 4 MG tablet therapy pack take as directed per package instructions    valACYclovir (VALTREX) 1,000 mg tablet take 1 tablet by mouth three times daily for 7 days     Allergies   Allergen Reactions    Pollen Extract Sneezing      Objective   /88 (Patient Position: Sitting, Cuff Size: Standard)   Pulse (!) 108   Temp 97.6 °F (36.4 °C) (Tympanic)   Resp 16   Ht 5' 2\" (1.575 m)   Wt 71.6 kg (157 lb 12.8 oz)   LMP  (LMP Unknown)   SpO2 97%   BMI 28.86 kg/m²     Physical Exam  Constitutional:       General: She is not in acute distress.     Appearance: Normal appearance. She is normal weight. She is not ill-appearing, toxic-appearing or diaphoretic.   Cardiovascular:      Rate and Rhythm: Normal " rate and regular rhythm.      Pulses: Normal pulses.      Heart sounds: Normal heart sounds. No murmur heard.     No gallop.   Pulmonary:      Effort: Pulmonary effort is normal. No respiratory distress.      Breath sounds: Normal breath sounds. No stridor. No wheezing, rhonchi or rales.   Chest:      Chest wall: No tenderness.   Musculoskeletal:      Right lower leg: No edema.      Left lower leg: No edema.   Neurological:      Mental Status: She is alert and oriented to person, place, and time.

## 2025-03-21 ENCOUNTER — TELEPHONE (OUTPATIENT)
Age: 80
End: 2025-03-21

## 2025-03-21 NOTE — TELEPHONE ENCOUNTER
PA for HYDROXYZINE 25MG SUBMITTED to [employer]    via      [x][facility]-Case ID #     [x]PA sent as URGENT    All office notes, labs and other pertaining documents and studies sent. Clinical questions answered. Awaiting determination from insurance company.     Turnaround time for your insurance to make a decision on your Prior Authorization can take 7-21 business days.

## 2025-03-25 NOTE — TELEPHONE ENCOUNTER
PA for HYDROXYZINE 25MG  DENIED    PLEASE SEE SCANNED MEDIA FOR ENTIRE REASONING    Reason:(Screenshot if applicable)        Message sent to office clinical pool Yes    Denial letter scanned into Media Yes    Appeal started No (Provider will need to decide if appeal is warranted and send clinical documentation to Prior Authorization Team for initiation.)    **Please follow up with your patient regarding denial and next steps**

## 2025-03-26 NOTE — TELEPHONE ENCOUNTER
Please asked the patient to try melatonin at bedtime for his sleep, she was also recommended to uptitrate gabapentin, this should also help with sleep.  Check on how she is doing with the shingles pain

## 2025-04-04 ENCOUNTER — HOSPITAL ENCOUNTER (OUTPATIENT)
Facility: HOSPITAL | Age: 80
Discharge: HOME | End: 2025-04-04
Payer: MEDICARE

## 2025-04-04 ENCOUNTER — TELEPHONE (OUTPATIENT)
Age: 80
End: 2025-04-04

## 2025-04-04 DIAGNOSIS — Z13.820 ENCOUNTER FOR OSTEOPOROSIS SCREENING IN ASYMPTOMATIC POSTMENOPAUSAL PATIENT: ICD-10-CM

## 2025-04-04 DIAGNOSIS — Z78.0 ENCOUNTER FOR OSTEOPOROSIS SCREENING IN ASYMPTOMATIC POSTMENOPAUSAL PATIENT: ICD-10-CM

## 2025-04-04 DIAGNOSIS — B02.9 HERPES ZOSTER WITHOUT COMPLICATION: Primary | ICD-10-CM

## 2025-04-04 PROCEDURE — 77080 DXA BONE DENSITY AXIAL: CPT

## 2025-04-04 RX ORDER — GABAPENTIN 300 MG/1
300 CAPSULE ORAL 3 TIMES DAILY
Qty: 270 CAPSULE | Refills: 0 | Status: SHIPPED | OUTPATIENT
Start: 2025-04-04 | End: 2026-03-30

## 2025-04-04 NOTE — TELEPHONE ENCOUNTER
Spoke with patient and discussed in detailed how to take her Gabapentin per .  if anything symptoms get worse to seek ED pt verbalized understanding.

## 2025-04-04 NOTE — TELEPHONE ENCOUNTER
I have sent the gabapentin as 300 mg tablet, recommend to take 1 tablet 3 times a day.  If patient continues to have persistent patient sensitivity she can take 1 tablet morning and evening, and increase to 2 tablets at night.

## 2025-04-04 NOTE — TELEPHONE ENCOUNTER
Patient called wanting to know if Dr Szymanski will be able to refill her gabapentin since she is completely out after today. Preferred pharmacy is the [facility]. She also wanted to update Dr Szymanski that she is still having a lot of sensitivity in her face. Please call patient back.

## 2025-04-07 ENCOUNTER — RESULTS FOLLOW-UP (OUTPATIENT)
Age: 80
End: 2025-04-07

## 2025-04-07 DIAGNOSIS — M85.89 OSTEOPENIA OF MULTIPLE SITES: Primary | ICD-10-CM

## 2025-04-07 DIAGNOSIS — E55.9 VITAMIN D DEFICIENCY: ICD-10-CM

## 2025-04-07 NOTE — RESULT ENCOUNTER NOTE
Spoke with Pic she wants me to sen over some information to her email in regards to the Osteoporosis medications for her to review.

## 2025-04-09 ENCOUNTER — APPOINTMENT (OUTPATIENT)
Dept: LAB | Facility: HOSPITAL | Age: 80
End: 2025-04-09
Payer: MEDICARE

## 2025-04-09 ENCOUNTER — TELEPHONE (OUTPATIENT)
Age: 80
End: 2025-04-09

## 2025-04-09 DIAGNOSIS — E55.9 VITAMIN D DEFICIENCY: ICD-10-CM

## 2025-04-09 DIAGNOSIS — Z11.59 NEED FOR HEPATITIS C SCREENING TEST: ICD-10-CM

## 2025-04-09 DIAGNOSIS — M85.89 OSTEOPENIA OF MULTIPLE SITES: ICD-10-CM

## 2025-04-09 LAB — 25(OH)D3 SERPL-MCNC: 42.7 NG/ML (ref 30–100)

## 2025-04-09 PROCEDURE — 86803 HEPATITIS C AB TEST: CPT

## 2025-04-09 PROCEDURE — 82306 VITAMIN D 25 HYDROXY: CPT

## 2025-04-09 PROCEDURE — 36415 COLL VENOUS BLD VENIPUNCTURE: CPT

## 2025-04-10 LAB — HCV AB SER QL: NORMAL

## 2025-05-08 ENCOUNTER — TELEPHONE (OUTPATIENT)
Age: 80
End: 2025-05-08

## 2025-05-08 DIAGNOSIS — G47.00 INSOMNIA, UNSPECIFIED TYPE: ICD-10-CM

## 2025-05-08 DIAGNOSIS — B02.9 HERPES ZOSTER WITHOUT COMPLICATION: ICD-10-CM

## 2025-05-08 RX ORDER — HYDROXYZINE HYDROCHLORIDE 25 MG/1
25 TABLET, FILM COATED ORAL
Qty: 90 TABLET | Refills: 0 | Status: SHIPPED | OUTPATIENT
Start: 2025-05-08

## 2025-05-08 NOTE — TELEPHONE ENCOUNTER
Patient called and would like you to order her Hydoxyzine HCL 25 mg   1 PO QD.  Please send to [facility]

## 2025-06-02 DIAGNOSIS — B02.9 HERPES ZOSTER WITHOUT COMPLICATION: ICD-10-CM

## 2025-06-03 RX ORDER — GABAPENTIN 100 MG/1
CAPSULE ORAL
Qty: 60 CAPSULE | Refills: 0 | Status: SHIPPED | OUTPATIENT
Start: 2025-06-03

## 2025-06-24 ENCOUNTER — OFFICE VISIT (OUTPATIENT)
Age: 80
End: 2025-06-24
Payer: MEDICARE

## 2025-06-24 VITALS
RESPIRATION RATE: 16 BRPM | SYSTOLIC BLOOD PRESSURE: 130 MMHG | DIASTOLIC BLOOD PRESSURE: 80 MMHG | WEIGHT: 158.6 LBS | BODY MASS INDEX: 29.19 KG/M2 | TEMPERATURE: 98.3 F | HEIGHT: 62 IN | OXYGEN SATURATION: 99 % | HEART RATE: 97 BPM

## 2025-06-24 DIAGNOSIS — G47.00 INSOMNIA, UNSPECIFIED TYPE: ICD-10-CM

## 2025-06-24 DIAGNOSIS — E78.2 MIXED HYPERLIPIDEMIA: ICD-10-CM

## 2025-06-24 DIAGNOSIS — E66.3 OVERWEIGHT: Primary | ICD-10-CM

## 2025-06-24 DIAGNOSIS — L29.9 PRURITUS: ICD-10-CM

## 2025-06-24 DIAGNOSIS — R03.0 ELEVATED BP WITHOUT DIAGNOSIS OF HYPERTENSION: ICD-10-CM

## 2025-06-24 DIAGNOSIS — B02.29 POSTHERPETIC NEURALGIA: ICD-10-CM

## 2025-06-24 DIAGNOSIS — M85.89 OSTEOPENIA OF MULTIPLE SITES: ICD-10-CM

## 2025-06-24 PROCEDURE — 99214 OFFICE O/P EST MOD 30 MIN: CPT | Performed by: INTERNAL MEDICINE

## 2025-06-24 PROCEDURE — G2211 COMPLEX E/M VISIT ADD ON: HCPCS | Performed by: INTERNAL MEDICINE

## 2025-06-24 RX ORDER — HYDROXYZINE HYDROCHLORIDE 25 MG/1
25 TABLET, FILM COATED ORAL
Qty: 90 TABLET | Refills: 0 | Status: SHIPPED | OUTPATIENT
Start: 2025-06-24 | End: 2025-06-27 | Stop reason: SDUPTHER

## 2025-06-24 NOTE — PROGRESS NOTES
Name: Donna Schoenek      : 1945      MRN: [id number]  Encounter Provider: Eric Szymanski MD  Encounter Date: 2025   Encounter department: [facility] PRIMARY CARE    Assessment & Plan  Overweight    Continue lifestyle modification including diet and exercise.  Orders:    Comprehensive metabolic panel; Future    Basic metabolic panel; Future    Mixed hyperlipidemia  LDL and triglycerides elevated, ASCVD of 34.5.  Discussed pharmacotherapy, patient wants to defer, recheck lipid panel in 6 months, discussed dietary modifications  Orders:    Comprehensive metabolic panel; Future    Basic metabolic panel; Future    Elevated BP without diagnosis of hypertension  Home BP logs are in the range of 130/70-80.  Patient has been watching salt in her diet and doing dietary changes.  Continue current management.  Orders:    Comprehensive metabolic panel; Future    Basic metabolic panel; Future    Osteopenia of multiple sites  Recent DEXA scan reviewed.  T-score of -2.3 left femoral neck.  We discussed pharmacotherapy.  Patient will consider options and let me know.  Vitamin D levels are within normal limits.  Continue calcium 600 mg twice daily.  Recommend strength training.  Orders:    Comprehensive metabolic panel; Future    Basic metabolic panel; Future    Postherpetic neuralgia  Pain has improved, patient has tapered and discontinue gabapentin.  Continues as needed Atarax at bedtime to help with sleep and some residual numbness and tingling sensation.  Orders:    hydrOXYzine HCL (ATARAX) 25 mg tablet; Take 1 tablet (25 mg total) by mouth daily at bedtime    Insomnia, unspecified type  Improved with bedtime Atarax.  Orders:    hydrOXYzine HCL (ATARAX) 25 mg tablet; Take 1 tablet (25 mg total) by mouth daily at bedtime         History of Present Illness     HPI    Patient comes for follow-up of chronic medical conditions, review of recent labs and imaging wherever applicable.  Denies any chest  "pain, shortness of breath, nausea or vomiting.   Review of Systems   Constitutional:  Negative for appetite change, chills, diaphoresis, fatigue, fever and unexpected weight change.   Respiratory:  Negative for apnea, cough, choking, chest tightness, shortness of breath, wheezing and stridor.    Cardiovascular:  Negative for chest pain, palpitations and leg swelling.   Gastrointestinal:  Negative for abdominal distention, abdominal pain, anal bleeding, blood in stool, constipation, diarrhea, nausea and vomiting.   Genitourinary:  Negative for decreased urine volume, difficulty urinating, frequency and urgency.   Musculoskeletal:  Negative for arthralgias, back pain and myalgias.   Neurological:  Negative for dizziness, light-headedness, numbness and headaches.     Past Medical History[1]  Past Surgical History[2]  Family History[3]  Social History[4]  Medications[5]  Allergies   Allergen Reactions    Pollen Extract Sneezing     Immunization History   Administered Date(s) Administered    COVID-19 PFIZER VACCINE 0.3 ML IM 02/19/2021, 03/12/2021    Pneumococcal Conjugate Vaccine 20-valent (Pcv20), Polysace 03/20/2025     Objective   /80 (Patient Position: Sitting, Cuff Size: Adult)   Pulse 97   Temp 98.3 °F (36.8 °C) (Temporal)   Resp 16   Ht 5' 2\" (1.575 m)   Wt 71.9 kg (158 lb 9.6 oz)   SpO2 99%   BMI 29.01 kg/m²     Physical Exam  Constitutional:       General: She is not in acute distress.     Appearance: Normal appearance. She is normal weight. She is not ill-appearing, toxic-appearing or diaphoretic.     Cardiovascular:      Rate and Rhythm: Normal rate and regular rhythm.      Pulses: Normal pulses.      Heart sounds: Normal heart sounds. No murmur heard.     No gallop.   Pulmonary:      Effort: Pulmonary effort is normal. No respiratory distress.      Breath sounds: Normal breath sounds. No stridor. No wheezing, rhonchi or rales.   Chest:      Chest wall: No tenderness.     Musculoskeletal:      " Right lower leg: No edema.      Left lower leg: No edema.     Neurological:      Mental Status: She is alert and oriented to person, place, and time.                [1]   Past Medical History:  Diagnosis Date    Shingles    [2] No past surgical history on file.  [3]   Family History  Problem Relation Name Age of Onset    Hearing loss Mother Alberta Medlar     COPD Mother Alberta Medlar     Heart disease Mother Alberta Medlar     Coronary artery disease Mother Alberta Medlar     Arthritis Mother Alberta Medlar     Vision loss Mother Alberta Medlar         detached retna    Heart disease Father     [4]   Social History  Tobacco Use    Smoking status: Former     Current packs/day: 0.00     Average packs/day: 1 pack/day for 35.0 years (35.0 ttl pk-yrs)     Types: Cigarettes     Start date: 1965     Quit date: 2000     Years since quittin.4    Smokeless tobacco: Never   Vaping Use    Vaping status: Never Used   Substance and Sexual Activity    Alcohol use: Yes     Comment: occasional glass of wine at special event    Drug use: Never    Sexual activity: Yes     Partners: Male     Birth control/protection: None     Comment: same partner for 24 years   [5]   Current Outpatient Medications on File Prior to Visit   Medication Sig    [DISCONTINUED] hydrOXYzine HCL (ATARAX) 25 mg tablet Take 1 tablet (25 mg total) by mouth daily at bedtime    [DISCONTINUED] gabapentin (NEURONTIN) 100 mg capsule Take 2 tablets 3 times a day for 1 week, then decrease to 1 tablet 3 times a day.    [DISCONTINUED] methylPREDNISolone 4 MG tablet therapy pack take as directed per package instructions (Patient not taking: Reported on 3/20/2025)    [DISCONTINUED] valACYclovir (VALTREX) 1,000 mg tablet take 1 tablet by mouth three times daily for 7 days (Patient not taking: Reported on 3/20/2025)

## 2025-06-24 NOTE — ASSESSMENT & PLAN NOTE
Home BP logs are in the range of 130/70-80.  Patient has been watching salt in her diet and doing dietary changes.  Continue current management.  Orders:    Comprehensive metabolic panel; Future    Basic metabolic panel; Future

## 2025-06-24 NOTE — ASSESSMENT & PLAN NOTE
{If prescribing weight loss medication, click here to fill out prior auth smartform and then hit F2 with this smartlist to insert prior auth documentation (Optional):[id number]}  Continue lifestyle modification including diet and exercise.  Orders:    Comprehensive metabolic panel; Future    Basic metabolic panel; Future

## 2025-06-24 NOTE — PATIENT INSTRUCTIONS
"  Patient Education     Controlling your blood pressure through lifestyle   The Basics   Written by the doctors and editors at [facility]   What does my lifestyle have to do with my blood pressure? -- The things you do and the foods you eat have a big effect on your blood pressure and your overall health. Following the right lifestyle can:   Lower your blood pressure, or keep you from getting high blood pressure in the first place   Reduce your need for blood pressure medicines   Make medicines for high blood pressure work better, if you do take them   Lower the chances that you'll have a heart attack or stroke, or develop kidney disease  Which lifestyle choices will help lower my blood pressure? -- You can:   Lose weight (if you are overweight).   Choose a diet rich in fruits, vegetables, and low-fat dairy products, and low in meats, sweets, and refined grains.   Eat less salt (sodium).   Do something active for at least 30 minutes a day on most days of the week.   Limit the amount of alcohol you drink.  If you have high blood pressure, it's also very important to quit smoking (if you smoke). Quitting smoking might not bring your blood pressure down. But it will lower the chances that you'll have a heart attack or stroke, and it will help you feel better and live longer.  Start low, and go slow -- The changes listed above might sound like a lot, but don't worry. You don't have to change everything all at once. The key to improving your lifestyle is to \"start low, and go slow.\" Choose 1 small, specific thing to change, and try doing it for a while. If it works for you, keep doing it until it becomes a habit. If it doesn't, don't give up. Choose something else to change, and see how that goes.  Let's say, for example, that you would like to improve your diet. If you're the type of person who eats cheeseburgers and French fries often, you can't switch to eating just salads from one day to the next. When people try to " "make changes like that, they often fail. Then, they feel frustrated and tend to give up. So instead of trying to change everything about your diet in 1 day, change 1 or 2 small things about your diet and give yourself time to get used to those changes. For instance, keep the cheeseburger but give up the French fries. Or eat the same things, but cut your portions in half.  As you find things that you are able to change and stick with, keep adding new changes. In time, you will see that you can actually change a lot. You just have to get used to the changes slowly.  Lose weight -- When people think about losing weight, they sometimes make it more complicated than it really is. To lose weight, you have to either eat less or move more. If you do both of those things, it's even better. But there is no single weight loss diet or activity that's better than any other. When it comes to weight loss, the most effective plan is the one that you'll stick with.  Improve your diet -- There is no single diet that is right for everyone. But in general, a healthy diet can include:   Lots of fruits, vegetables, and whole grains   Some beans, peas, lentils, chickpeas, and similar foods   Some nuts, such as walnuts, almonds, and peanuts   Fat-free or low-fat milk and milk products   Some fish  To have a healthy diet, it's also important to limit or avoid sugar, sweets, meats, and refined grains. (Refined grains are found in white bread, white rice, most forms of pasta, and most packaged \"snack\" foods.)  Reduce salt -- Many people think that eating a low-sodium diet means avoiding the salt shaker and not adding salt when cooking. The truth is, not adding salt at the table or when you cook will only help a little. Almost all of the sodium you eat is already in the food you buy at the grocery store or at restaurants (figure 1).  The most important thing you can do to cut down on sodium is to eat less processed food. That means that you " "should avoid most foods that are sold in cans, boxes, jars, and bags. You should also eat in restaurants less often.  To reduce the amount of sodium you eat, buy fresh or fresh-frozen fruits, vegetables, and meats. (Fresh-frozen foods have had nothing added to them before freezing.) Then, you can make meals at home, from scratch, with these ingredients.  As with the other changes, don't try to cut out salt all at once. Instead, choose 1 or 2 foods that have a lot of sodium and try to replace them with low-sodium choices. When you get used to those low-sodium options, find another food or 2 to change. Then, keep going, until all of the foods you eat are sodium-free or low in sodium.  Become more active -- If you want to be more active, you don't have to go to the gym or get all sweaty. It is possible to increase your activity level while doing everyday things you enjoy. Walking, gardening, and dancing are just a few of the things that you might try. As with all the other changes, the key is not to do too much too fast. If you don't do any activity now, start by walking for just a few minutes every other day. Do that for a few weeks. If you stick with it, try doing it for longer. But if you find that you don't like walking, try a different activity.  Drink less alcohol -- If you are female, do not have more than 1 \"standard drink\" of alcohol a day. If you are male, do not have more than 2. A \"standard drink\" is:   A can or bottle that has 12 ounces of beer   A glass that has 5 ounces of wine   A shot that has 1.5 ounces of hard alcohol  Where should I start? -- If you want to improve your lifestyle, start by making the changes that you think would be easiest for you. If you used to exercise and just got out of the habit, maybe it would be easy for you to start exercising again. Or if you actually like cooking meals from scratch, maybe the first thing you should focus on is eating home-cooked meals that are low in " "sodium.  Whatever you tackle first, choose specific, realistic goals, and give yourself a deadline. For example, do not decide that you are going to \"exercise more.\" Instead, decide that you are going to walk for 10 minutes on Monday, Wednesday, and Friday, and that you are going to do this for the next 2 weeks.  When lifestyle changes are too general, people have a hard time following through.  Now go. You can do it!  All topics are updated as new evidence becomes available and our peer review process is complete.  This topic retrieved from [employer] on: Feb 28, 2024.  Topic 01541 Version 15.0  Release: 32.2.4 - C32.58  © 2024 UpToDate, Inc. and/or its affiliates. All rights reserved.  figure 1: Sources of sodium in your diet     Graphic 01479 Version 2.0  Consumer Information Use and Disclaimer   Disclaimer: This generalized information is a limited summary of diagnosis, treatment, and/or medication information. It is not meant to be comprehensive and should be used as a tool to help the user understand and/or assess potential diagnostic and treatment options. It does NOT include all information about conditions, treatments, medications, side effects, or risks that may apply to a specific patient. It is not intended to be medical advice or a substitute for the medical advice, diagnosis, or treatment of a health care provider based on the health care provider's examination and assessment of a patient's specific and unique circumstances. Patients must speak with a health care provider for complete information about their health, medical questions, and treatment options, including any risks or benefits regarding use of medications. This information does not endorse any treatments or medications as safe, effective, or approved for treating a specific patient. UpToDate, Inc. and its affiliates disclaim any warranty or liability relating to this information or the use thereof.The use of this information is governed by " "the Terms of Use, available at https://www.GenlotterseTutoruwer.com/en/know/clinical-effectiveness-terms. 2024© UpToDate, Inc. and its affiliates and/or licensors. All rights reserved.  Copyright   © 2024 UpToDate, Inc. and/or its affiliates. All rights reserved.    Patient Education     DASH diet   The Basics   Written by the doctors and editors at [facility]   What is the DASH diet? -- DASH stands for \"dietary approaches to stop hypertension.\" It is an eating plan that can help lower blood pressure. It can also help prevent high blood pressure, which doctors call \"hypertension.\" You don't need special foods or recipes to follow the DASH diet. It is more about eating certain types of foods in certain amounts.  The DASH diet has lots of fruits and vegetables, whole grains, lean meats, healthy fats, and low-fat or fat-free dairy products (figure 1). It is low in saturated fats, trans fats, cholesterol, added sugars, and sodium (salt).  The standard DASH diet limits sodium to no more than 2300 mg a day. Your doctor or nurse can talk to you about what your specific goals should be.  Why do I need the DASH diet? -- The DASH diet can help you:   Lower your blood pressure and cholesterol   Lower your risk for cancer, heart disease, heart attack, and stroke. It might also lower your risk for heart failure, kidney stones, and diabetes.   Lose weight or keep a healthy weight  What can I eat and drink on the DASH diet? -- Below are some guidelines and examples for your daily and weekly nutrition goals. These are based on a 2000-calorie-per-day eating plan.  Daily goals:   Grains - Try to eat 6 to 8 servings of whole-grain, high-fiber foods each day. Examples of a serving include 1 slice of bread, 1 ounce (30 grams) of dry cereal, or 1/2 cup (120 grams) of cooked cereal, pasta, or brown rice.   Fruits - Try to eat 4 to 5 servings of fruit each day. Examples of a serving include 1 medium fruit or 1/2 cup (75 grams) of fresh, frozen, or " "canned fruit. Try to eat different kinds and colors. Frozen or canned fruit should not have added sugar. Look for frozen or canned fruits with 100 percent fruit juice or water.   Vegetables - Try to eat 4 to 5 servings of vegetables each day. Examples of a serving include 1 cup (40 grams) of leafy greens or 1/2 cup (75 grams) of fresh or cooked vegetables. Try to pick many kinds and colors. If you buy canned vegetables, look for \"low sodium\" or \"salt free.\" Buy plain, frozen vegetables to avoid added fat and sodium.   Dairy - Try to eat 2 to 3 servings of fat-free or low-fat milk products each day. Examples of a serving include 1 cup (240 mL) of milk or yogurt or 1.5 ounces (45 grams) of cheese.   Lean meats, poultry, and seafood - Try to eat 6 or fewer servings of lean meat, poultry, and seafood each day. Examples of a serving include 1 egg or 1 ounce (30 grams) of cooked meat, poultry, or fish. Try to choose more low-fat or lean meats like chicken, fish, or turkey. Eat less red meat.   Fats and oils - Try to eat 2 to 3 servings of fats and oils each day. Examples of a serving include 1 teaspoon (5 mL) of soft margarine or vegetable oil, or 1 tablespoon (18 grams) of mayonnaise. Eat healthy fats like those found in fish, nuts, and avocados. Try using olive oil or vegetable oils such as canola oil. You can also try corn, safflower, sunflower, or soybean oils. Use low-sodium and low-fat salad dressing and mayonnaise.  Weekly goals:   Nuts, seeds, and legumes (dry beans and peas) - Try to eat 4 to 5 servings each week. Examples of a serving include 1/3 cup (45 grams) of nuts, 2 tablespoons (50 grams) of nut butter or seeds, or 1/2 cup (75 grams) of cooked legumes. Try almonds and walnuts, sunflower seeds, peanut or other nut butters, soybeans, lentils, kidney beans, and split peas.   Sweets - Try to eat fewer than 5 servings each week. Examples of a serving include 1 tablespoon (14 grams) of sugar or jelly, or 1/2 cup " (120 grams) of gelatin. Choose low-fat and trans fat-free desserts. These include fruit-flavored gelatin, sorbet, jellybeans, rabia crackers, animal crackers, low-fat fig bars, and madhav snaps. Eat fruit to satisfy the desire for sweets.  To add flavor, use pepper, herbs, spices, vinegar, or lemon or lime juices. Choose low-sodium or salt-free products whenever you can. This is especially important for foods like broths, soups, or soy sauce.  What foods and drinks should I avoid on the DASH diet?    Grains to avoid - Salted breads, rolls, crackers, quick breads, self-rising flours, biscuit mixes, regular breadcrumbs, instant hot cereals, commercially prepared rice, pasta, stuffing mixes.   Fruits and vegetables to avoid - Store-bought prepared potatoes and vegetable mixes, regular canned vegetables and juices, vegetables frozen with sauce, pickled vegetables, processed fruits with salt or sodium.   Dairy products to avoid - Whole milk, malted milk, chocolate milk, buttermilk, full-fat cheese, ice cream.   Meats to avoid - Smoked, cured, salted, or canned fish such as sardines or anchovies. High-fat cuts of meat like beef, lamb, pork, zambrano and sausage, and chicken with the skin on it.   Fats and oils to avoid - Eat fewer solid fats like butter, lard, and hard stick margarine. Eat less saturated fat, trans fat, and total fat.   Condiments and snacks to avoid - Salted and canned peas, beans, and olives. Salted snack foods, fried foods, soda, other sweetened drinks.   Sweets to avoid - High-fat baked goods such as muffins, donuts, pastries, and commercial baked goods. Candy bars.   Alcohol - If you choose to drink alcohol, limit the amount. Most doctors recommend limiting alcohol to no more than 1 drink a day (for females) or 2 drinks a day (for males).  What else do I need to know?    Get regular physical activity to make this diet help you even more. Even gentle forms of activity, like walking, are good for your  health.   Try baking or broiling instead of frying foods.   Write down the foods that you eat. This will help you track what you have eaten each week.   When you go to the grocery store, have a list or a meal plan. Don't shop when you are hungry, since this might lead you to buy more unhealthy foods.   Read food labels with care (figure 2). They show you how much is in a serving. The amount is given as a percentage of the total amount that you need each day. Reading labels helps you make healthy food choices.  All topics are updated as new evidence becomes available and our peer review process is complete.  This topic retrieved from [employer] on: Feb 26, 2024.  Topic 083651 Version 1.0  Release: 32.2.4 - C32.56  © 2024 UpToDate, Inc. and/or its affiliates. All rights reserved.  figure 1: DASH diet     Graphic 516379 Version 1.0  figure 2: Food label     Graphic 298651 Version 1.0  Consumer Information Use and Disclaimer   Disclaimer: This generalized information is a limited summary of diagnosis, treatment, and/or medication information. It is not meant to be comprehensive and should be used as a tool to help the user understand and/or assess potential diagnostic and treatment options. It does NOT include all information about conditions, treatments, medications, side effects, or risks that may apply to a specific patient. It is not intended to be medical advice or a substitute for the medical advice, diagnosis, or treatment of a health care provider based on the health care provider's examination and assessment of a patient's specific and unique circumstances. Patients must speak with a health care provider for complete information about their health, medical questions, and treatment options, including any risks or benefits regarding use of medications. This information does not endorse any treatments or medications as safe, effective, or approved for treating a specific patient. UpToDate, Inc. and its affiliates  "disclaim any warranty or liability relating to this information or the use thereof.The use of this information is governed by the Terms of Use, available at https://www.wolterskluwer.com/en/know/clinical-effectiveness-terms. 2024© UpToDate, Inc. and its affiliates and/or licensors. All rights reserved.  Copyright   © 2024 UpToDate, Inc. and/or its affiliates. All rights reserved.    Patient Education     Low-fat diet   The Basics   Written by the doctors and editors at [facility]   What are fats? -- The fat in the food you eat is often classified into 2 groups:   \"Healthy\" fats - These are monounsaturated or polyunsaturated fats. They tend to be more liquid at room temperature. Healthy fats are found in things like olive oil, canola oil, and sesame oil. They are also found in nuts, seeds, avocados, and nut butters.   \"Unhealthy\" fats - These are saturated and trans fats. Saturated fats are animal fats. Trans fats are artificial fats, like partially hydrogenated oils. These fats raise your cholesterol. Unhealthy fats tend to be more solid at room temperature. They are found in meats, egg yolks, butter, cheese, and full-fat milk products. They are also found in some fried foods, butter, margarine, and baked goods like cookies or cakes.  Why do I need a low-fat diet? -- The amounts and kinds of fats you eat can affect your health. This is especially true if you have specific conditions such as blocked arteries or gallbladder problems. Eating fewer unhealthy fats is 1 way to improve your health.  Some people try to eat less fat because they want to lose weight or avoid gaining weight. But this does not always work. That's because weight gain is related to how many calories you eat, no matter what foods they come from. Eating fewer unhealthy fats can be an important part of a weight loss plan. But it's also important to be aware of how many calories you are getting from other foods.  What can I eat and drink on a low-fat " "diet? -- Choose foods with healthy fats.  Foods with healthy fats include:   Canola, peanut, and olive oil   Safflower, sunflower, soybean, and corn oil   Walnuts, almonds, pecans, hazelnuts, cashews, and peanuts   Pumpkin, sesame, flax, and sunflower seeds   [place], tuna, and some other fish   Tofu   Soy milk   Avocado  Other healthy foods with lower amounts of fats include:   Fat-free (non-fat) or low-fat milk, yogurt, and cheese   Light, low-fat or fat-free cream cheese and sour cream   Dried beans, lentils, and tofu   Fruits and vegetables   Whole-grain breads, cereals, pastas, and rice   High-fiber foods, like oatmeal, fruits, beans, and nuts. These have soluble fiber, which helps lower cholesterol in the body.   Deli ham, turkey, chicken breast, and lean roast beef  What foods and drinks should I limit on a low-fat diet? -- It is important to limit certain foods with unhealthy fats.  Limit these types of foods that have saturated fats:   Whole-fat dairy products like cheese, ice cream, whole milk, and cream   High-fat meats like beef, lamb, poultry with the skin, zambrano, hot dogs, and sausage   Processed deli meats like bologna, pepperoni, and salami   Butter and lard   Palm and coconut oils   Mayonnaise, salad dressings, gravies, and sauces  Limit these types of foods that might have trans fats:   Granola, candy, and baked goods like cookies, cakes, doughnuts, and muffins   Pizza dough, and pie crusts that are packaged   Fried foods   Frozen dinners   Chips and crackers   Microwave popcorn   Stick margarine and vegetable shortenings  What else should I know? -- You do not have to remove all fat from your diet. Instead, pay attention to the amount and kinds of fats that you eat.   Read the labels of store-bought foods (figure 1) to find out how much fat is in each. Under 5 percent of total fat on a label means that it is \"low fat.\" Over 20 percent of total fat on a label means that it is \"high fat.\" Avoid foods " "with \"partially hydrogenated oil\" in the ingredient list. This means that there is trans fat in the food.   Change how you cook to help lower the amount of fat in your food:   Remove the fatty parts of meat and the skin from poultry before cooking   Bake, broil, grill, poach, or roast poultry, fish, and lean meats   Drain and throw away the fat that comes out of meat as you cook it   Try to add little or no fat to foods   Use olive or canola oil for cooking or baking   Steam your vegetables   Use herbs or no-oil marinades to flavor foods  All topics are updated as new evidence becomes available and our peer review process is complete.  This topic retrieved from [employer] on: Mar 13, 2024.  Topic 494693 Version 4.0  Release: 32.2.4 - C32.71  © 2024 UpToDate, Inc. and/or its affiliates. All rights reserved.  figure 1: Nutrition label - Fats     This is an example of a nutrition label. To figure out how much and what kinds of fats are in a food, look for the lines that say \"Saturated Fat\" and \"Trans Fat.\" It's also important to look at the serving size. This food has 3 grams of saturated fat and 2 grams of trans fat in each serving, and each serving is 2 tablespoons (32 grams).  Graphic 297341 Version 1.0  Consumer Information Use and Disclaimer   Disclaimer: This generalized information is a limited summary of diagnosis, treatment, and/or medication information. It is not meant to be comprehensive and should be used as a tool to help the user understand and/or assess potential diagnostic and treatment options. It does NOT include all information about conditions, treatments, medications, side effects, or risks that may apply to a specific patient. It is not intended to be medical advice or a substitute for the medical advice, diagnosis, or treatment of a health care provider based on the health care provider's examination and assessment of a patient's specific and unique circumstances. Patients must speak with a health " "care provider for complete information about their health, medical questions, and treatment options, including any risks or benefits regarding use of medications. This information does not endorse any treatments or medications as safe, effective, or approved for treating a specific patient. UpToDate, Inc. and its affiliates disclaim any warranty or liability relating to this information or the use thereof.The use of this information is governed by the Terms of Use, available at https://www.Shift Networker.com/en/know/clinical-effectiveness-terms. 2024© UpToDate, Inc. and its affiliates and/or licensors. All rights reserved.  Copyright   © 2024 UpToDate, Inc. and/or its affiliates. All rights reserved.    Patient Education     Calcium and vitamin D for bone health   The Basics   Written by the doctors and editors at [employer]   Why are calcium and vitamin D important for bone health? -- They are important for bone health because they can:   Help keep bones strong   Prevent bones from breaking easily, especially bones in the spine   Help keep teeth healthy and strong  What can happen if people do not have healthy bones? -- People who do not have healthy bones can have a condition called \"osteoporosis.\" This can cause bones to:   Become thin and weak   Break more easily - Bone breaks often occur in the spine, hip, and arm bones near the wrist.  There are different treatments for osteoporosis, including medicines. But it's also important to eat and drink foods that have calcium and vitamin D. Eating enough calcium and vitamin D is an important first step in preventing and treating osteoporosis.  What foods and drinks have calcium and vitamin D? -- Different foods and drinks have calcium and vitamin D (figure 1). Some foods and drinks have more than others.  Foods and drinks that have a lot of calcium include:   Milk, yogurt, cheese, cottage cheese, ice cream, and other dairy foods   Green vegetables, such as kale, dorian " greens, and broccoli   Certain nuts and breads   Foods that have calcium added to them, such as juices, cereals, and soy products  Foods and drinks that have a lot of vitamin D include:   Milk, orange juice, or yogurt with vitamin D added   [place] or mackerel   Canned tuna fish   Cereals with vitamin D added   Cod liver oil  Your body can also get vitamin D from the sun. The body uses sunlight that shines on the skin to make vitamin D. But doctors don't suggest spending a lot of time in the sun to get vitamin D. That's because getting too much sun can lead to serious problems, including skin cancer.  What are supplements? -- These are pills, capsules, liquids, or tablets that have nutrients in them. They are another way people can get calcium and vitamin D. Some supplements contain either calcium or vitamin D. Others have both.  Do I need to take calcium or vitamin D supplements? -- People who do not get enough calcium or vitamin D from their food and drink might need to take supplements.  If your doctor recommends that you take calcium or vitamin D supplements, ask which type, how much, and when to take them. For example, some calcium supplements can be taken with food, but others should be taken on an empty stomach.  The type and dose of supplement that is right for you also depends on your medical problems and the other medicines you take.  What are some common side effects of calcium supplements? -- Common side effects can include:   Constipation - People who are constipated can have trouble having bowel movements.   Upset stomach  Many people find that they can reduce these side effects by splitting up their calcium dose. For example, they might take a few small doses of calcium each day instead of 1 large dose.  Taking calcium supplements can also increase a person's chance of getting kidney stones. These are small, hard, stone-like objects that can form in the kidneys.  How much calcium and vitamin D do I  "need each day? -- It depends, because each person is different. Ask your doctor or nurse how much calcium and vitamin D you need each day. Females who have gone through menopause and no longer get monthly periods usually need more calcium each day than those who still get monthly periods.  It is important not to take too much calcium or vitamin D. Taking too much calcium or vitamin D can cause problems.  All topics are updated as new evidence becomes available and our peer review process is complete.  This topic retrieved from [employer] on: Mar 31, 2024.  Topic 78794 Version 12.0  Release: 32.2.4 - C32.89  © 2024 UpToDate, Inc. and/or its affiliates. All rights reserved.  figure 1: Foods and drinks with calcium and vitamin D     Foods rich in calcium include ice cream, soy milk, breads, kale, broccoli, milk, cheese, cottage cheese, almonds, yogurt, ready-to-eat cereals, beans, and tofu. Foods rich in vitamin D include milk, fortified plant-based \"milks\" (soy, almond), canned tuna fish, cod liver oil, yogurt, ready-to-eat-cereals, cooked salmon, canned sardines, mackerel, and eggs. Some of these foods are rich in both.  Graphic 90867 Version 4.0  Consumer Information Use and Disclaimer   Disclaimer: This generalized information is a limited summary of diagnosis, treatment, and/or medication information. It is not meant to be comprehensive and should be used as a tool to help the user understand and/or assess potential diagnostic and treatment options. It does NOT include all information about conditions, treatments, medications, side effects, or risks that may apply to a specific patient. It is not intended to be medical advice or a substitute for the medical advice, diagnosis, or treatment of a health care provider based on the health care provider's examination and assessment of a patient's specific and unique circumstances. Patients must speak with a health care provider for complete information about their health, " medical questions, and treatment options, including any risks or benefits regarding use of medications. This information does not endorse any treatments or medications as safe, effective, or approved for treating a specific patient. UpToDate, Inc. and its affiliates disclaim any warranty or liability relating to this information or the use thereof.The use of this information is governed by the Terms of Use, available at https://www.[employer].com/en/know/clinical-effectiveness-terms. 2024© UpToDate, Inc. and its affiliates and/or licensors. All rights reserved.  Copyright   © 2024 UpToDate, Inc. and/or its affiliates. All rights reserved.    Patient Education     Weight-Bearing Exercises   About this topic   Weight-bearing exercise is any activity that you do while standing up. This means you build strong bones and muscles as they work against the force of gravity. Your bones get stronger as you exercise and move about.  The best time to build bone strength is in your growing years. Bones still grow into your 20s or early 30s. It is also important to do weight-bearing exercises after the mid-30s. That is when bone loss often begins.  Severe bone loss may also be called osteoporosis. Mild bone loss is called osteopenia. It is a health problem where the amount of bone gets smaller and weaker. People with osteoporosis should do weight-bearing exercises to prevent more bone loss and broken bones.  Weight-bearing exercise may include:  Brisk walking, hiking, or running  Aerobics, dancing, or climbing stairs  Sports like:  Basketball  Bowling  Golf  Tennis  Skiing  Skating  Baseball  Karate  Soccer  Nehemias Chi or yoga  Pushing a lawnmower  Using free weights while standing up  Activities that are good for your heart and lungs but are NOT weight-bearing exercises:  Cycling  Swimming  General   Before starting with a program, ask your doctor if you are healthy enough to do these exercises. Your doctor may have you work with  a  or physical therapist to make a safe exercise program to meet your needs.  Strengthening Exercises   Strengthening exercises keep your muscles firm and strong. Start by repeating each exercise 2 to 3 times. Work up to doing each exercise 10 times. Try to do the exercises 2 to 3 times each day. Do all exercises slowly.  Mini-squats ? Stand up straight in front of a counter and hold on with your hands. Have your feet spread about a foot apart. Bend your knees while keeping your back straight. Hold for 3 to 5 seconds. Return to straight standing position. At first, start by just having a slight bend at the knee. To make it harder, bend your knees deeper or hold the position longer. Do not go any lower than 90 degrees.  Standing leg lifts / marching ? Hold onto a counter. March in place, holding each leg up for 3 to 5 seconds.  Heel raises ? Hold onto a counter. If you feel steady, you may do this with your walker in front of a chair. Lift your heels up and rise up onto your toes. Hold for 3 to 5 seconds. Lower yourself back down.  Wall push-ups ? Stand about 18 inches (45 cm) away from a wall. Place your hands on the wall in front of both shoulders. Keeping your back straight, lean forward by bending your elbows until your face almost touches the wall. Now, straighten the elbows and push your body away from the wall. As this gets easier, try doing a push-up leaning on a counter to put more body weight through your arms.  Doing exercise with weights while standing are weight bearing exercises. For example, you can do biceps curls using free weights.  Choose a weight that will let you to repeat the exercise 10 times before resting. Work your way up to 3 sets of 10 before raising the weight. If you are not able to do 10 repetitions, you are using too heavy of a weight.  Bicep curls using free weights ? Stand with your feet about shoulder width apart. Keep your weight equal on both feet. Hold the weights in front  of you with your palms facing up. Bend your elbow and slowly move your lower arm towards your chest. Do not move your shoulder. Hold this position for 2 seconds and lower your arm to the starting position. You may exercise both arms at the same time. You can also do this exercise with different hand positions. Try doing this exercise with thumbs pointed up or palms facing down to get all parts of the biceps muscle.             What will the results be?   Stronger bones  Stronger muscles  Less bone loss  Improve coordination and balance  Helpful tips   Stay active and work out to keep your muscles strong and flexible.  Keep a healthy weight to avoid putting too much stress on your joints. Eat a healthy diet to keep your muscles healthy.  Be sure you do not hold your breath when exercising. This can raise your blood pressure. If you tend to hold your breath, try counting out loud when exercising. If any exercise bothers you, stop right away.  Try walking or cycling at an easy pace for a few minutes to warm up your muscles. Do this again after exercising.  If you have osteoporosis, try to avoid weight bearing activities that may put you at risk for falling like skiing and skating.  Try to add more walking into your daily routines to get more weight-bearing exercise:  Take the stairs instead of going on an elevator.  Park far away from places to get in more steps.  Exercise may be slightly uncomfortable, but you should not have sharp pains. If you do get sharp pains, stop what you are doing. If the sharp pains continue, call your doctor.  Last Reviewed Date   2021-11-05  Consumer Information Use and Disclaimer   This generalized information is a limited summary of diagnosis, treatment, and/or medication information. It is not meant to be comprehensive and should be used as a tool to help the user understand and/or assess potential diagnostic and treatment options. It does NOT include all information about conditions,  treatments, medications, side effects, or risks that may apply to a specific patient. It is not intended to be medical advice or a substitute for the medical advice, diagnosis, or treatment of a health care provider based on the health care provider's examination and assessment of a patient’s specific and unique circumstances. Patients must speak with a health care provider for complete information about their health, medical questions, and treatment options, including any risks or benefits regarding use of medications. This information does not endorse any treatments or medications as safe, effective, or approved for treating a specific patient. UpToDate, Inc. and its affiliates disclaim any warranty or liability relating to this information or the use thereof. The use of this information is governed by the Terms of Use, available at https://www.[employer].com/en/know/clinical-effectiveness-terms   Copyright   Copyright © 2024 UpToDate, Inc. and its affiliates and/or licensors. All rights reserved.    Patient Education     Medicines for osteoporosis   The Basics   Written by the doctors and editors at [employer]   What do osteoporosis medicines do? -- If you have osteoporosis or a high risk of breaking a bone, the medicines your doctor prescribes can:   Reduce bone loss   Increase bone density or keep it about the same   Reduce the chances that you will break a bone  For the medicines to work, you must also take in enough calcium and vitamin D. Your doctor or nurse will tell you how much calcium and vitamin D you need each day. Many people need supplements to get enough.  Which medicines might I need? -- There are many different osteoporosis medicines. Your doctor will work with you to choose the best one for you.  The list below gives basic information on osteoporosis medicines. The table lists the names of some common osteoporosis medicines (table 1).  For more detailed information about your medicines, ask your  "doctor or nurse for the patient drug information handout from [facility]. It explains how to use each medicine, describes its possible side effects, and lists other medicines or foods that can affect how it works.  Bisphosphonates -- Most people being treated for osteoporosis take these medicines first. If they do not work well enough or cause side effects that are too troublesome, there are other options.  Bisphosphonates come as a pill or a shot. Most people take 1 pill every week. If your doctor prescribes a bisphosphonate pill, you must take the medicine exactly as directed. If you don't, the medicine can irritate your throat or stomach. For most bisphosphonate pills, you must:   Take the pill first thing in the morning, before you eat or drink anything.   Drink an 8-ounce glass of water with the pill, but don't eat or drink anything else for 30 minutes or 1 hour (depending on which pill you take).   Avoid lying down for 30 minutes after taking the pill. You must sit or stand during that time.  There is 1 bisphosphonate pill, delayed-release risedronate (brand name: Atelvia), that is taken in a different way from the others. It is taken after breakfast with 4 ounces of water.  \"Estrogen-like\" medicines -- Medicines called selective estrogen receptor modifiers (\"SERMs\") act like the hormone estrogen. Estrogen helps prevent bone loss.  After menopause, when monthly periods stop, the body has less estrogen. SERMs can act like estrogen to stop bone loss. Some of them also reduce the risk of breast cancer in women at high risk. SERMs are only for people who have gone through menopause.  Hormone medicines -- These medicines are sometimes called \"menopausal hormone therapy\" (\"MHT\"). After menopause, the body has lower levels of certain hormones. Some people take MHT to treat bothersome symptoms related to this, such as hot flashes. MHT can also protect against osteoporosis.  Hormones are not used often to treat " "osteoporosis in people who have gone through menopause. This is because other medicines usually work much better. But people who take MHT for hot flashes will have the extra benefit of preventing osteoporosis.  People who have not gone through menopause but have problems with their periods might take hormones in birth control pills or a patch.  Some males get osteoporosis because their bodies do not make enough of a hormone called \"testosterone.\" If this happens, doctors can give testosterone to prevent or treat the osteoporosis.  PTH or PTHrP analog -- Both of these are artificial forms of hormones the body makes naturally. PTH stands for \"parathyroid hormone,\" and PTHrP stands for \"parathyroid hormone-related protein.\" Both tell the body to make new bone. They are usually only for people with severe osteoporosis.  Romosozumab -- This is a medicine that blocks a protein in the body. The protein usually stops new bone from being formed. Blocking the protein lets the body make new bone. Romosozumab is usually only for people with severe osteoporosis.  Denosumab -- This blocks a different protein in the body. The protein usually causes bone to break down. By blocking the protein, denosumab reduces bone loss and the chance of breaking a bone. If other osteoporosis medicines cause bad side effects or do not help, your doctor might give you denosumab. It might also be a good choice for people with kidney problems.  When you stop taking denosumab, your bone density goes down again very quickly. Some people might be at higher risk for breaking a bone when this happens. If you stop denosumab, your doctor will prescribe a different osteoporosis medicine to prevent rapid bone loss.  How long do I need to take osteoporosis medicines? -- If you remain at high risk for breaking a bone, you can safely take osteoporosis medicines for many years. If you are no longer at high risk for breaking a bone, you might be able to stop your " "medicine for a year or more. If you do stop the medicine, your doctor will check your bone density to make sure that you are not losing too much bone. You might need to start an osteoporosis medicine again later.  What else should I know about medicines for osteoporosis? -- Some people have heard that taking bisphosphonates or denosumab for a long time can increase the risk of breaking certain bones. This is true, but it happens very rarely. Your chances of breaking a bone from osteoporosis are much higher than your chances of breaking one because you take bisphosphonates or denosumab.  Some people are at higher risk for breaking a bone after stoppingdenosumab. When denosumab is stopped, your doctor will prescribe a different osteoporosis medicine.  If you take osteoporosis medicines, your doctor will do regular exams and tests to see how well the medicines are working. If they are not working well, you might need a different medicine.  All topics are updated as new evidence becomes available and our peer review process is complete.  This topic retrieved from [employer] on: Mar 30, 2024.  Topic 26382 Version 11.0  Release: 32.2.4 - C32.88  © 2024 UpToDate, Inc. and/or its affiliates. All rights reserved.  table 1: Medicines to treat or prevent osteoporosis     Generic name  Sample US brand names  How it's taken    Bisphosphonates  Alendronate Fosamax, Binosto 1 pill a day or 1 pill a week  You can also take this as a liquid or a pill that can dissolve in water    Risedronate Actonel, Atelvia 1 pill a day, 1 pill a week, or 1 pill a month    Ibandronate Boniva 1 pill a month, or 1 injection given in a vein every 3 months    Zoledronic acid Reclast 1 injection given in a vein every year   \"Estrogen-like\" medicines (SERMs)  Raloxifene Evista 1 pill a day    Bazedoxifene and conjugated equine estrogens Duavee 1 pill a day   Hormone medicines  Estrogen-progestin therapy Prempro, Premphase, Climara Pro, others 1 pill a day, " or a patch you wear on your skin that is changed 1 time a week    Estrogen therapy Premarin, Cenestin, Climara, Menostar, Vivelle-Dot, others 1 pill a day, or a patch you wear on your skin that is changed either 1 or 2 times a week    Birth control pills (for people who have not been through menopause) Many different brand names 1 pill a day    Testosterone (for testosterone deficiency) Many different forms and brand names Depends on form   Parathyroid hormone (PTH)  Teriparatide Forteo 1 injection under the skin every day   Parathyroid hormone-related protein (PTHrP) analog  Abaloparatide Tymlos 1 injection under the skin every day   Romosozumab  Romosozumab Evenity 2 injections under the skin (1 after the other) once a month   Denosumab  Denosumab Prolia 1 injection under the skin 2 times a year   [facility] 31894 Version 6.0  Consumer Information Use and Disclaimer   Disclaimer: This generalized information is a limited summary of diagnosis, treatment, and/or medication information. It is not meant to be comprehensive and should be used as a tool to help the user understand and/or assess potential diagnostic and treatment options. It does NOT include all information about conditions, treatments, medications, side effects, or risks that may apply to a specific patient. It is not intended to be medical advice or a substitute for the medical advice, diagnosis, or treatment of a health care provider based on the health care provider's examination and assessment of a patient's specific and unique circumstances. Patients must speak with a health care provider for complete information about their health, medical questions, and treatment options, including any risks or benefits regarding use of medications. This information does not endorse any treatments or medications as safe, effective, or approved for treating a specific patient. UpToDate, Inc. and its affiliates disclaim any warranty or liability relating to this  information or the use thereof.The use of this information is governed by the Terms of Use, available at https://www.[employer].com/en/know/clinical-effectiveness-terms. 2024© UpToDate, Inc. and its affiliates and/or licensors. All rights reserved.  Copyright   © 2024 [employer] and/or its affiliates. All rights reserved.

## 2025-06-26 ENCOUNTER — TELEPHONE (OUTPATIENT)
Age: 80
End: 2025-06-26

## 2025-06-26 NOTE — TELEPHONE ENCOUNTER
PA for hydrOXYzine 25 mg SUBMITTED to Medicare     via      [x][employer]-Case ID # [id number]     [x]PA sent as URGENT    All office notes, labs and other pertaining documents and studies sent. Clinical questions answered. Awaiting determination from insurance company.     Turnaround time for your insurance to make a decision on your Prior Authorization can take 7-21 business days.

## 2025-06-26 NOTE — TELEPHONE ENCOUNTER
PA for hydrOXYzine  DENIED    Reason:(Screenshot if applicable)        Message sent to office clinical pool Yes    Denial letter scanned into Media Yes    We can gladly do an appeal but the process can take about 30-60 days to provide determination. Please have the office staff schedule a Peer to Peer at phone [phone]  . If an appeal is truly warranted please have Provider send clinical documentation to the PA department to support the appeal.     **Please follow up with your patient regarding denial and next steps**

## 2025-06-27 RX ORDER — HYDROXYZINE HYDROCHLORIDE 25 MG/1
25 TABLET, FILM COATED ORAL
Qty: 90 TABLET | Refills: 0 | Status: SHIPPED | OUTPATIENT
Start: 2025-06-27

## 2025-06-27 NOTE — TELEPHONE ENCOUNTER
Qualifying diagnosis added to prescription details that new prescriptions have been sent to pharmacy.

## 2025-06-30 ENCOUNTER — APPOINTMENT (OUTPATIENT)
Dept: LAB | Facility: HOSPITAL | Age: 80
End: 2025-06-30
Attending: INTERNAL MEDICINE
Payer: MEDICARE

## 2025-06-30 ENCOUNTER — TELEPHONE (OUTPATIENT)
Age: 80
End: 2025-06-30

## 2025-06-30 DIAGNOSIS — R03.0 ELEVATED BP WITHOUT DIAGNOSIS OF HYPERTENSION: ICD-10-CM

## 2025-06-30 DIAGNOSIS — M85.89 OSTEOPENIA OF MULTIPLE SITES: ICD-10-CM

## 2025-06-30 DIAGNOSIS — E66.3 OVERWEIGHT: ICD-10-CM

## 2025-06-30 DIAGNOSIS — E78.2 MIXED HYPERLIPIDEMIA: ICD-10-CM

## 2025-06-30 LAB
ALBUMIN SERPL BCG-MCNC: 4.2 G/DL (ref 3.5–5)
ALP SERPL-CCNC: 75 U/L (ref 34–104)
ALT SERPL W P-5'-P-CCNC: 11 U/L (ref 7–52)
ANION GAP SERPL CALCULATED.3IONS-SCNC: 8 MMOL/L (ref 4–13)
AST SERPL W P-5'-P-CCNC: 16 U/L (ref 13–39)
BILIRUB SERPL-MCNC: 0.56 MG/DL (ref 0.2–1)
BUN SERPL-MCNC: 19 MG/DL (ref 5–25)
CALCIUM SERPL-MCNC: 9.3 MG/DL (ref 8.4–10.2)
CHLORIDE SERPL-SCNC: 104 MMOL/L (ref 96–108)
CO2 SERPL-SCNC: 28 MMOL/L (ref 21–32)
CREAT SERPL-MCNC: 0.64 MG/DL (ref 0.6–1.3)
GFR SERPL CREATININE-BSD FRML MDRD: 85 ML/MIN/1.73SQ M
GLUCOSE P FAST SERPL-MCNC: 95 MG/DL (ref 65–99)
POTASSIUM SERPL-SCNC: 3.7 MMOL/L (ref 3.5–5.3)
PROT SERPL-MCNC: 7.5 G/DL (ref 6.4–8.4)
SODIUM SERPL-SCNC: 140 MMOL/L (ref 135–147)

## 2025-06-30 PROCEDURE — 80053 COMPREHEN METABOLIC PANEL: CPT

## 2025-06-30 PROCEDURE — 36415 COLL VENOUS BLD VENIPUNCTURE: CPT

## 2025-06-30 NOTE — TELEPHONE ENCOUNTER
Pt calling in after leaving the lab. She states when she last saw Dr. Szymanski she wanted the pt to have a urinalysis done. Pt states she isn't experiencing any UTI symptoms, but thinks provider wanted the urinalysis done.    Please advise, thank you

## 2025-06-30 NOTE — TELEPHONE ENCOUNTER
Spoke with the pharmacy stating new authorization needs to be done with new prescription with the new DX code.

## 2025-06-30 NOTE — TELEPHONE ENCOUNTER
PA for hydrOXYzine 25 mg  APPEALED via       [x]Other site or means PHONE     All necessary records sent. Will await response from insurance company    Turnaround time for a decision to be made on an appeal could take up to 30 business days

## 2025-06-30 NOTE — TELEPHONE ENCOUNTER
Urine testing was completed in March 2025.  Unless patient has any UTI symptoms, no need for any urine testing at this time